# Patient Record
Sex: FEMALE | Race: BLACK OR AFRICAN AMERICAN | NOT HISPANIC OR LATINO | Employment: FULL TIME | URBAN - METROPOLITAN AREA
[De-identification: names, ages, dates, MRNs, and addresses within clinical notes are randomized per-mention and may not be internally consistent; named-entity substitution may affect disease eponyms.]

---

## 2018-04-04 ENCOUNTER — OFFICE VISIT (OUTPATIENT)
Dept: URGENT CARE | Facility: CLINIC | Age: 48
End: 2018-04-04
Payer: COMMERCIAL

## 2018-04-04 VITALS
HEART RATE: 104 BPM | TEMPERATURE: 98 F | WEIGHT: 208.75 LBS | DIASTOLIC BLOOD PRESSURE: 82 MMHG | OXYGEN SATURATION: 99 % | SYSTOLIC BLOOD PRESSURE: 142 MMHG

## 2018-04-04 DIAGNOSIS — I10 HYPERTENSION, UNSPECIFIED TYPE: ICD-10-CM

## 2018-04-04 DIAGNOSIS — R25.2 MUSCLE CRAMPS: ICD-10-CM

## 2018-04-04 DIAGNOSIS — E11.8 TYPE 2 DIABETES MELLITUS WITH COMPLICATION, UNSPECIFIED LONG TERM INSULIN USE STATUS: ICD-10-CM

## 2018-04-04 DIAGNOSIS — N89.8 VAGINAL ITCHING: Primary | ICD-10-CM

## 2018-04-04 PROCEDURE — 3077F SYST BP >= 140 MM HG: CPT | Mod: CPTII,S$GLB,, | Performed by: NURSE PRACTITIONER

## 2018-04-04 PROCEDURE — 99999 PR PBB SHADOW E&M-NEW PATIENT-LVL III: CPT | Mod: PBBFAC,,, | Performed by: NURSE PRACTITIONER

## 2018-04-04 PROCEDURE — 99214 OFFICE O/P EST MOD 30 MIN: CPT | Mod: S$GLB,,, | Performed by: NURSE PRACTITIONER

## 2018-04-04 PROCEDURE — 3079F DIAST BP 80-89 MM HG: CPT | Mod: CPTII,S$GLB,, | Performed by: NURSE PRACTITIONER

## 2018-04-04 RX ORDER — AMLODIPINE BESYLATE 10 MG/1
10 TABLET ORAL
COMMUNITY
Start: 2017-07-24 | End: 2018-04-17 | Stop reason: SDUPTHER

## 2018-04-04 RX ORDER — FLUCONAZOLE 150 MG/1
150 TABLET ORAL ONCE
Qty: 2 TABLET | Refills: 0 | Status: SHIPPED | OUTPATIENT
Start: 2018-04-04 | End: 2018-04-04

## 2018-04-04 RX ORDER — CYCLOBENZAPRINE HCL 10 MG
10 TABLET ORAL 3 TIMES DAILY PRN
Qty: 20 TABLET | Refills: 0 | Status: SHIPPED | OUTPATIENT
Start: 2018-04-04 | End: 2018-04-14

## 2018-04-04 NOTE — LETTER
April 4, 2018      [facility]  [address] [facility] [address]  [place] [address]  Phone: [phone]  Fax: [phone]       Patient: Luciana Melo   YOB: 1970  Date of Visit: 04/04/2018    To Whom It May Concern:    Melissa Melo  was at [facility] on 04/04/2018. She may return to work/school on 04/05/2018 with no restrictions. If you have any questions or concerns, or if I can be of further assistance, please do not hesitate to contact me.    Sincerely,              Adrian Durham NP

## 2018-04-05 NOTE — PROGRESS NOTES
Subjective:       Patient ID: Luciana Melo is a 47 y.o. female.    Chief Complaint: No chief complaint on file.    Pt is a 47 year old female to clinic today with complaints of bilateral leg cramping that began this morning when she woke. Pt states cramping resolved after approximately 2 hours. She also states she took an otc vitamin which she believes helped. Also, complaints of a yeast infection and vaginal itching that began 1 week ago.       Vaginal Itching   The patient's primary symptoms include genital itching. The patient's pertinent negatives include no genital lesions, genital odor, genital rash, missed menses, pelvic pain, vaginal bleeding or vaginal discharge. This is a recurrent problem. The current episode started in the past 7 days. The problem occurs intermittently. The problem has been waxing and waning. The patient is experiencing no pain. The problem affects both sides. She is not pregnant. Pertinent negatives include no abdominal pain, anorexia, back pain, chills, constipation, diarrhea, discolored urine, dysuria, fever, flank pain, frequency, headaches, hematuria, joint pain, joint swelling, nausea, painful intercourse, rash, sore throat, urgency or vomiting. Nothing aggravates the symptoms. She has tried nothing for the symptoms.     Review of Systems   Constitutional: Negative for chills, diaphoresis, fatigue and fever.   HENT: Negative for congestion, sinus pressure and sore throat.    Eyes: Negative for pain.   Respiratory: Negative for cough, chest tightness, shortness of breath and wheezing.    Cardiovascular: Negative for chest pain and palpitations.   Gastrointestinal: Negative for abdominal pain, anorexia, constipation, diarrhea, nausea and vomiting.   Genitourinary: Negative for dysuria, flank pain, frequency, hematuria, missed menses, pelvic pain, urgency and vaginal discharge.   Musculoskeletal: Positive for myalgias (bilateral calfs). Negative for back pain, gait problem, joint  pain and neck pain.   Skin: Negative for rash.   Neurological: Negative for dizziness, light-headedness and headaches.       Objective:      Physical Exam   Constitutional: She is oriented to person, place, and time. She appears well-developed and well-nourished. No distress.   HENT:   Head: Normocephalic.   Right Ear: External ear normal.   Left Ear: External ear normal.   Nose: Nose normal.   Eyes: Pupils are equal, round, and reactive to light.   Genitourinary:   Genitourinary Comments: Exam deferred.     Musculoskeletal:        Right lower leg: She exhibits no tenderness, no bony tenderness, no swelling, no edema, no deformity and no laceration.        Left lower leg: She exhibits no tenderness, no bony tenderness, no swelling, no edema, no deformity and no laceration.   Neurological: She is alert and oriented to person, place, and time.   Skin: Skin is warm and dry. No rash noted. She is not diaphoretic.   Psychiatric: She has a normal mood and affect. Her speech is normal and behavior is normal. Thought content normal.   Nursing note and vitals reviewed.      Assessment:       1. Vaginal itching    2. Muscle cramps    3. Hypertension, unspecified type    4. Type 2 diabetes mellitus with complication, unspecified long term insulin use status        Plan:   Vaginal itching  -     fluconazole (DIFLUCAN) 150 MG Tab; Take 1 tablet (150 mg total) by mouth once. May repeat after 72 hours if symptoms persist  Dispense: 2 tablet; Refill: 0    Muscle cramps  -     cyclobenzaprine (FLEXERIL) 10 MG tablet; Take 1 tablet (10 mg total) by mouth 3 (three) times daily as needed for Muscle spasms.  Dispense: 20 tablet; Refill: 0    Hypertension, unspecified type  -     Ambulatory referral to Internal Medicine    Type 2 diabetes mellitus with complication, unspecified long term insulin use status  -     Ambulatory referral to Internal Medicine      Recommend establish care with PCP on appt made at todays visit.  Recommend OTC  vitamin and increase fluids. (electrolyte beverages)  Educated on diabetic diet.    Follow prescribed treatment plan as directed.  Stay hydrated and rest.  Report to ER if symptoms worsen.  Follow up with PCP in 2-3 days or sooner if symptoms do not improve.

## 2018-04-05 NOTE — PATIENT INSTRUCTIONS
Diet: Diabetes  Food is an important tool that you can use to control diabetes and stay healthy. Eating well-balanced meals in the correct amounts will help you control your blood glucose levels and prevent low blood sugar reactions. It will also help you reduce the health risks of diabetes. There is no one specific diet that is right for everyone with diabetes. But there are general guidelines to follow. A registered dietitian (RD) will create a tailored diet approach thats just right for you. He or she will also help you plan healthy meals and snacks. If you have any questions, call your dietitian for advice.     Guidelines for success  Talk with your healthcare provider before starting a diabetes diet or weight loss program. If you haven't talked with a dietitian yet, ask your provider for a referral. The following guidelines can help you succeed:  · Select foods from the 6 food groups below. Your dietitian will help you find food choices within each group. He or she will also show you serving sizes and how many servings you can have at each meal.  ¨ Grains, beans, and starchy vegetables  ¨ Vegetables  ¨ Fruit  ¨ Milk or yogurt  ¨ Meat, poultry, fish, or tofu  ¨ Healthy fats  · Check your blood sugar levels as directed by your provider. Take any medicine as prescribed by your provider.  · Learn to read food labels and pick the right portion sizes.  · Eat only the amount of food in your meal plan. Eat about the same amount of food at regular times each day. Dont skip meals. Eat meals 4 to 5 hours apart, with snacks in between.  · Limit alcohol. It raises blood sugar levels. Drink water or calorie-free diet drinks that use safe sweeteners.  · Eat less fat to help lower your risk of heart disease. Use nonfat or low-fat dairy products and lean meats. Avoid fried foods. Use cooking oils that are unsaturated, such as olive, canola, or peanut oil.  · Talk with your dietitian about safe sugar substitutes.  · Avoid  added salt. It can contribute to high blood pressure, which can cause heart disease. People with diabetes already have a risk of high blood pressure and heart disease.  · Stay at a healthy weight. If you need to lose weight, cut down on your portion sizes. But dont skip meals. Exercise is an important part of any weight management program. Talk with your provider about an exercise program thats right for you.  · For more information about the best diet plan for you, talk with a registered dietitian (RD). To find an RD in your area, contact:  ¨ Academy of Nutrition and Dietetics www.eatright.org  ¨ The American Diabetes Association 756-114-7863 www.diabetes.org  Date Last Reviewed: 8/1/2016 © 2000-2017 [employer] [address] [place] [address], [place] [address]. All rights reserved. This information is not intended as a substitute for professional medical care. Always follow your healthcare professional's instructions.

## 2018-04-17 ENCOUNTER — OFFICE VISIT (OUTPATIENT)
Dept: INTERNAL MEDICINE | Facility: CLINIC | Age: 48
End: 2018-04-17
Payer: COMMERCIAL

## 2018-04-17 ENCOUNTER — LAB VISIT (OUTPATIENT)
Dept: LAB | Facility: HOSPITAL | Age: 48
End: 2018-04-17
Attending: FAMILY MEDICINE
Payer: COMMERCIAL

## 2018-04-17 VITALS
DIASTOLIC BLOOD PRESSURE: 84 MMHG | TEMPERATURE: 97 F | OXYGEN SATURATION: 99 % | HEART RATE: 100 BPM | SYSTOLIC BLOOD PRESSURE: 136 MMHG | HEIGHT: 69 IN | BODY MASS INDEX: 31.06 KG/M2 | WEIGHT: 209.69 LBS

## 2018-04-17 DIAGNOSIS — I15.2 HYPERTENSION ASSOCIATED WITH DIABETES: ICD-10-CM

## 2018-04-17 DIAGNOSIS — E78.2 MIXED HYPERLIPIDEMIA: ICD-10-CM

## 2018-04-17 DIAGNOSIS — D25.9 UTERINE LEIOMYOMA, UNSPECIFIED LOCATION: ICD-10-CM

## 2018-04-17 DIAGNOSIS — F17.200 TOBACCO USE DISORDER: ICD-10-CM

## 2018-04-17 DIAGNOSIS — Z91.199 NON-COMPLIANCE WITH TREATMENT: ICD-10-CM

## 2018-04-17 DIAGNOSIS — E11.59 HYPERTENSION ASSOCIATED WITH DIABETES: ICD-10-CM

## 2018-04-17 DIAGNOSIS — Z00.00 ROUTINE GENERAL MEDICAL EXAMINATION AT A HEALTH CARE FACILITY: ICD-10-CM

## 2018-04-17 DIAGNOSIS — Z00.00 ROUTINE GENERAL MEDICAL EXAMINATION AT A HEALTH CARE FACILITY: Primary | ICD-10-CM

## 2018-04-17 DIAGNOSIS — D64.9 ANEMIA, UNSPECIFIED TYPE: ICD-10-CM

## 2018-04-17 DIAGNOSIS — E66.9 OBESITY (BMI 30.0-34.9): ICD-10-CM

## 2018-04-17 PROCEDURE — 80053 COMPREHEN METABOLIC PANEL: CPT

## 2018-04-17 PROCEDURE — 82607 VITAMIN B-12: CPT

## 2018-04-17 PROCEDURE — 82728 ASSAY OF FERRITIN: CPT

## 2018-04-17 PROCEDURE — 3079F DIAST BP 80-89 MM HG: CPT | Mod: CPTII,S$GLB,, | Performed by: FAMILY MEDICINE

## 2018-04-17 PROCEDURE — 99999 PR PBB SHADOW E&M-EST. PATIENT-LVL V: CPT | Mod: PBBFAC,,, | Performed by: FAMILY MEDICINE

## 2018-04-17 PROCEDURE — 83036 HEMOGLOBIN GLYCOSYLATED A1C: CPT

## 2018-04-17 PROCEDURE — 84443 ASSAY THYROID STIM HORMONE: CPT

## 2018-04-17 PROCEDURE — 99396 PREV VISIT EST AGE 40-64: CPT | Mod: S$GLB,,, | Performed by: FAMILY MEDICINE

## 2018-04-17 PROCEDURE — 82306 VITAMIN D 25 HYDROXY: CPT

## 2018-04-17 PROCEDURE — 36415 COLL VENOUS BLD VENIPUNCTURE: CPT | Mod: PO

## 2018-04-17 PROCEDURE — 80061 LIPID PANEL: CPT

## 2018-04-17 PROCEDURE — 3075F SYST BP GE 130 - 139MM HG: CPT | Mod: CPTII,S$GLB,, | Performed by: FAMILY MEDICINE

## 2018-04-17 PROCEDURE — 85025 COMPLETE CBC W/AUTO DIFF WBC: CPT

## 2018-04-17 RX ORDER — HYDROCHLOROTHIAZIDE 12.5 MG/1
12.5 TABLET ORAL
COMMUNITY
Start: 2017-05-26 | End: 2018-04-17

## 2018-04-17 RX ORDER — MECLIZINE HYDROCHLORIDE 25 MG/1
25 TABLET ORAL
COMMUNITY
Start: 2018-02-15 | End: 2018-04-17

## 2018-04-17 RX ORDER — PEN NEEDLE, DIABETIC 30 GX3/16"
NEEDLE, DISPOSABLE MISCELLANEOUS
COMMUNITY
Start: 2017-10-10 | End: 2018-10-26 | Stop reason: SDUPTHER

## 2018-04-17 RX ORDER — FERROUS SULFATE 325(65) MG
325 TABLET ORAL DAILY
COMMUNITY

## 2018-04-17 RX ORDER — AMLODIPINE BESYLATE 10 MG/1
10 TABLET ORAL DAILY
Qty: 90 TABLET | Refills: 1 | Status: SHIPPED | OUTPATIENT
Start: 2018-04-17 | End: 2019-04-18

## 2018-04-17 RX ORDER — LISINOPRIL 5 MG/1
5 TABLET ORAL DAILY
Qty: 30 TABLET | Refills: 1 | Status: SHIPPED | OUTPATIENT
Start: 2018-04-17 | End: 2018-06-14

## 2018-04-17 NOTE — PROGRESS NOTES
Subjective:       Patient ID: Luciana Melo is a 48 y.o. female.    Chief Complaint: Establish Care    48-year-old Afro-American female patient previously seen by [facility] here to establish care.  Patient with Patient Active Problem List:     Mixed hyperlipidemia     Hypertension associated with diabetes     Uncontrolled type 2 diabetes mellitus with microalbuminuria, with long-term current use of insulin     Tobacco use disorder     Anemia     Fibroid tumor     Hypertensive heart disease without CHF     Obesity (BMI 30.0-34.9)     Non-compliance with treatment  Reports that she has been taking Lantus 50 units but not regularly at a specific time, has been taking amlodipine 10 mg, patient ran out of hydrochlorothiazide 12.5 mg daily for the past 1 month.   Occasionally has tingling and numbness sensation to the feet but not regularly, denies of any polyuria polydipsia polyphagia, has been noticing weight loss, unintentionally, patient has been feeling tired lately.   Continues to smoke 2-3 cigarettes daily.  Denies of any chest pain or difficulty breathing or palpitations  Patient was informed about anemia and has been taking iron tablets once daily, continues to take vitamin B12, was informed that she has fibroids and has been monitored by gynecologist at [facility], does not recall her name.   Reports having exercise with walking 1-2 days a week        Review of Systems   Constitutional: Positive for fatigue and unexpected weight change.   Eyes: Negative for visual disturbance.   Respiratory: Negative for shortness of breath.    Cardiovascular: Negative for chest pain and leg swelling.   Gastrointestinal: Negative for abdominal pain, nausea and vomiting.   Endocrine: Negative for polydipsia, polyphagia and polyuria.   Musculoskeletal: Negative for myalgias.   Skin: Negative for rash.   Neurological: Positive for numbness. Negative for weakness, light-headedness and headaches.   Psychiatric/Behavioral:  "Negative for sleep disturbance.         /84 (BP Location: Right arm, Patient Position: Sitting)   Pulse 100   Temp 97.3 °F (36.3 °C) (Tympanic)   Ht 5' 9" (1.753 m)   Wt 95.1 kg (209 lb 10.5 oz)   LMP 02/01/2018 (Within Days)   SpO2 99%   BMI 30.96 kg/m²   Objective:      Physical Exam   Constitutional: She is oriented to person, place, and time. She appears well-developed and well-nourished.   HENT:   Head: Normocephalic and atraumatic.   Mouth/Throat: Oropharynx is clear and moist.   Cardiovascular: Normal rate, regular rhythm and normal heart sounds.    No murmur heard.  Pulses:       Dorsalis pedis pulses are 2+ on the right side, and 2+ on the left side.   Pulmonary/Chest: Effort normal and breath sounds normal. She has no wheezes.   Abdominal: Soft. Bowel sounds are normal. There is no tenderness.   Musculoskeletal: She exhibits no edema or tenderness.   Feet:   Right Foot:   Protective Sensation: 10 sites tested. 10 sites sensed.   Skin Integrity: Negative for callus or dry skin.   Left Foot:   Protective Sensation: 10 sites tested. 10 sites sensed.   Skin Integrity: Negative for callus or dry skin.   Neurological: She is alert and oriented to person, place, and time.   Skin: Skin is warm and dry. No rash noted.   Psychiatric: She has a normal mood and affect.         Assessment:       1. Routine general medical examination at a health care facility    2. Hypertension associated with diabetes    3. Uncontrolled type 2 diabetes mellitus with microalbuminuria, with long-term current use of insulin    4. Mixed hyperlipidemia    5. Tobacco use disorder    6. Anemia, unspecified type    7. Uterine leiomyoma, unspecified location    8. Obesity (BMI 30.0-34.9)    9. Non-compliance with treatment        Plan:   Routine general medical examination at a health care facility  -     CBC auto differential; Future; Expected date: 04/17/2018  -     Comprehensive metabolic panel; Future; Expected date: " 04/17/2018  -     Lipid panel; Future; Expected date: 04/17/2018  -     TSH; Future; Expected date: 04/17/2018  -     Hemoglobin A1c; Future; Expected date: 04/17/2018  -     Urinalysis; Future; Expected date: 04/17/2018  -     Microalbumin/creatinine urine ratio; Future; Expected date: 04/17/2018  Patient reports that she ate 2 cookies this morning, would like to get labs done today.   Vital signs stable today  Clinical exam stable  Encouraged to start strict lifestyle changes with 1800 ADA low-fat and low-cholesterol diet and exercise 30 minutes daily  Refuses further vaccinations  Patient was advised to make appointment with gynecologist at [facility], for annual well woman exam    Hypertension associated with diabetes  -     Comprehensive metabolic panel; Future; Expected date: 04/17/2018  -     Lipid panel; Future; Expected date: 04/17/2018  -     TSH; Future; Expected date: 04/17/2018  -     Urinalysis; Future; Expected date: 04/17/2018  -     amLODIPine (NORVASC) 10 MG tablet; Take 1 tablet (10 mg total) by mouth once daily.  Dispense: 90 tablet; Refill: 1  -     lisinopril (PRINIVIL,ZESTRIL) 5 MG tablet; Take 1 tablet (5 mg total) by mouth once daily.  Dispense: 30 tablet; Refill: 1  Blood pressure stable today currently on amlodipine 10 mg, will add lisinopril 5 mg daily due to  diabetes.     Uncontrolled type 2 diabetes mellitus with microalbuminuria, with long-term current use of insulin  -     Ambulatory consult to Diabetic Education  -     Comprehensive metabolic panel; Future; Expected date: 04/17/2018  -     Lipid panel; Future; Expected date: 04/17/2018  -     Hemoglobin A1c; Future; Expected date: 04/17/2018  -     Microalbumin/creatinine urine ratio; Future; Expected date: 04/17/2018  -     Ambulatory referral to Optometry  -     insulin detemir U-100 (LEVEMIR FLEXTOUCH U-100 INSULN) 100 unit/mL (3 mL) SubQ InPn pen; INJECT 60 UNITS UNDER THE SKIN EACH EVENING  Dispense: 15 mL; Refill: 1  Will  increase Levemir from 50-60 units daily, patient reports that her sugars have been running in the range of 350s to 400s.   Diabetic foot exam stable today  Will need eye exam  Strict lifestyle changes recommended with 1800 ADA low-fat and low-cholesterol diet and exercise 30 minutes daily  Will refer to diabetes clinic for management of blood glucose levels which has been uncontrolled  Refill given on Levemir daily  Encouraged to monitor blood glucose levels at least twice daily and bring blood glucose log to diabetes clinic and to my next visit    Mixed hyperlipidemia  -     Lipid panel; Future; Expected date: 04/17/2018  Reports that she was on statins in the past but has been discontinued as her cholesterol levels stabilized, will check levels and may need to start on low dose statin    Tobacco use disorder  -     Ambulatory referral to Smoking Cessation Program  Encouraged to quit smoking.  Will refer to smoking cessation program    Anemia, unspecified type  -     CBC auto differential; Future; Expected date: 04/17/2018  -     Vitamin D; Future; Expected date: 04/17/2018  -     Vitamin B12; Future; Expected date: 04/17/2018  -     Ferritin; Future; Expected date: 04/17/2018  Likely secondary to fibroids, advised to discuss further with gynecology.  Currently taking iron tablets and B12, will check further labs    Uterine leiomyoma, unspecified location-[facility]    Obesity (BMI 30.0-34.9)-lifestyle modifications recommended to lose weight with BMI 30    Non-compliance with treatment  Reinforced about medication compliance to control blood glucose levels.

## 2018-04-18 LAB
25(OH)D3+25(OH)D2 SERPL-MCNC: 12 NG/ML
ALBUMIN SERPL BCP-MCNC: 3.8 G/DL
ALP SERPL-CCNC: 128 U/L
ALT SERPL W/O P-5'-P-CCNC: 32 U/L
ANION GAP SERPL CALC-SCNC: 15 MMOL/L
AST SERPL-CCNC: 30 U/L
BASOPHILS # BLD AUTO: 0.04 K/UL
BASOPHILS NFR BLD: 0.4 %
BILIRUB SERPL-MCNC: 0.6 MG/DL
BUN SERPL-MCNC: 7 MG/DL
CALCIUM SERPL-MCNC: 10 MG/DL
CHLORIDE SERPL-SCNC: 99 MMOL/L
CHOLEST SERPL-MCNC: 178 MG/DL
CHOLEST/HDLC SERPL: 5.6 {RATIO}
CO2 SERPL-SCNC: 21 MMOL/L
CREAT SERPL-MCNC: 0.8 MG/DL
DIFFERENTIAL METHOD: ABNORMAL
EOSINOPHIL # BLD AUTO: 0.2 K/UL
EOSINOPHIL NFR BLD: 2.2 %
ERYTHROCYTE [DISTWIDTH] IN BLOOD BY AUTOMATED COUNT: 17.3 %
EST. GFR  (AFRICAN AMERICAN): >60 ML/MIN/1.73 M^2
EST. GFR  (NON AFRICAN AMERICAN): >60 ML/MIN/1.73 M^2
ESTIMATED AVG GLUCOSE: 312 MG/DL
FERRITIN SERPL-MCNC: 38 NG/ML
GLUCOSE SERPL-MCNC: 267 MG/DL
HBA1C MFR BLD HPLC: 12.5 %
HCT VFR BLD AUTO: 41.8 %
HDLC SERPL-MCNC: 32 MG/DL
HDLC SERPL: 18 %
HGB BLD-MCNC: 13 G/DL
IMM GRANULOCYTES # BLD AUTO: 0.04 K/UL
IMM GRANULOCYTES NFR BLD AUTO: 0.4 %
LDLC SERPL CALC-MCNC: 109.4 MG/DL
LYMPHOCYTES # BLD AUTO: 3.6 K/UL
LYMPHOCYTES NFR BLD: 32.3 %
MCH RBC QN AUTO: 22.4 PG
MCHC RBC AUTO-ENTMCNC: 31.1 G/DL
MCV RBC AUTO: 72 FL
MONOCYTES # BLD AUTO: 0.4 K/UL
MONOCYTES NFR BLD: 3.7 %
NEUTROPHILS # BLD AUTO: 6.8 K/UL
NEUTROPHILS NFR BLD: 61 %
NONHDLC SERPL-MCNC: 146 MG/DL
NRBC BLD-RTO: 0 /100 WBC
PLATELET # BLD AUTO: 423 K/UL
PMV BLD AUTO: 9.6 FL
POTASSIUM SERPL-SCNC: 4.1 MMOL/L
PROT SERPL-MCNC: 8.4 G/DL
RBC # BLD AUTO: 5.8 M/UL
SODIUM SERPL-SCNC: 135 MMOL/L
TRIGL SERPL-MCNC: 183 MG/DL
TSH SERPL DL<=0.005 MIU/L-ACNC: 0.57 UIU/ML
VIT B12 SERPL-MCNC: 771 PG/ML
WBC # BLD AUTO: 11.13 K/UL

## 2018-04-19 ENCOUNTER — TELEPHONE (OUTPATIENT)
Dept: INTERNAL MEDICINE | Facility: CLINIC | Age: 48
End: 2018-04-19

## 2018-04-19 DIAGNOSIS — E78.2 MIXED HYPERLIPIDEMIA: Primary | ICD-10-CM

## 2018-04-19 DIAGNOSIS — E55.9 VITAMIN D DEFICIENCY: ICD-10-CM

## 2018-04-19 RX ORDER — ERGOCALCIFEROL 1.25 MG/1
50000 CAPSULE ORAL
Qty: 10 CAPSULE | Refills: 0 | Status: SHIPPED | OUTPATIENT
Start: 2018-04-19 | End: 2018-06-14

## 2018-04-19 RX ORDER — ATORVASTATIN CALCIUM 20 MG/1
20 TABLET, FILM COATED ORAL DAILY
Qty: 90 TABLET | Refills: 3 | Status: SHIPPED | OUTPATIENT
Start: 2018-04-19 | End: 2019-04-03

## 2018-04-19 NOTE — TELEPHONE ENCOUNTER
Spoke with pt, advised of medications and recommendations and to keep appt with diabetes clinic as scheduled to follow-up on elevated A1c. Pt verbalized understanding. FLORENCE

## 2018-04-25 ENCOUNTER — TELEPHONE (OUTPATIENT)
Dept: INTERNAL MEDICINE | Facility: CLINIC | Age: 48
End: 2018-04-25

## 2018-04-25 NOTE — TELEPHONE ENCOUNTER
----- Message from Tosin Nunn sent at 4/25/2018 11:27 AM CDT -----  Contact: Patient  Patient needs to speak to nurse regarding some FMLA paperwork, please call her back at [phone]. Thank you

## 2018-04-25 NOTE — TELEPHONE ENCOUNTER
"Spoke with pt who stated she needs FMLA paperwork filled out due to having diabetes and her blood sugar level goes up and down and she will wake up and feel "generally bad."  Wants FMLA completed so that she can be late to work or not go in to work on days when she feels bad.  Stated she went in to work late today because she felt dizzy and weak.  Advised pt to fax FMLA paperwork to clinic so that Dr. Lloyd can look at paperwork and chart and determine if she will be able to complete forms.  Notified pt that message will be sent to Dr. Lloyd and forms will be given to Dr. Lloyd once they are received and that she will get a phone call from Dr. Lloyd's office by tomorrow morning to let her know whether or not Dr. Lloyd will be able to fill out forms.  Pt verbalized understanding.  "

## 2018-04-26 NOTE — TELEPHONE ENCOUNTER
Conveyed Dr. Lloyd's message to pt re: not being able to fill out FMLA paperwork for uncontrolled diabetes.  Pt stated she didn't have uncontrolled diabetes and that she just doesn't feel well sometimes.  Stated Dr. Lloyd must not have gotten her message or that what she said must have been conveyed to Dr. Lloyd wrong.  I read my message that was sent to Dr. Lloyd and asked if she agreed with the message.  Pt stated she did.  I then read to the pt Dr. Lloyd's message verbatim.  Pt interrupted me several times while I was reading the message stating she does not have uncontrolled diabetes and that she does take her medications as prescribed and has never not taken her medications.  Pt stated Dr. Lloyd can tear up her FMLA paperwork and that she will find a doctor who can accommodate her.         Will not be able to fill out FMLA  paperwork for uncontrolled diabetes.  Patient needs to keep her appointments with diabetes clinic as scheduled and work on lowering blood glucose levels, she has been uncontrolled in the past, secondary to not taking her medications regularly.  Patient needs to work on getting her blood glucose levels under control by taking her medications as recommended including insulin , her symptoms will improve once her glucose levels will start to stabilize.      Dr. Lloyd (Routing comment)

## 2018-04-27 ENCOUNTER — NURSE TRIAGE (OUTPATIENT)
Dept: ADMINISTRATIVE | Facility: CLINIC | Age: 48
End: 2018-04-27

## 2018-04-30 ENCOUNTER — TELEPHONE (OUTPATIENT)
Dept: INTERNAL MEDICINE | Facility: CLINIC | Age: 48
End: 2018-04-30

## 2018-04-30 NOTE — TELEPHONE ENCOUNTER
----- Message from Selena Stanley sent at 4/30/2018  3:44 PM CDT -----  Contact: pt  Pt is requesting a call back from in regards to getting  Her insulin approved by her insurance. Pt state that her Insulin is $300.00  And needs one that is  Less costly .   [phone] (home)

## 2018-04-30 NOTE — TELEPHONE ENCOUNTER
----- Message from Aster Campos sent at 4/30/2018  2:14 PM CDT -----  Contact: Patient   Patient is still waiting on her call back, Please call her at [phone].    Thanks  Td

## 2018-04-30 NOTE — TELEPHONE ENCOUNTER
Pt states that Levemir is not covered by [employer].  She is supposed to have 60 units qhs.  She has not had her insulin in 3 days.  I advised pt to contact her insurance to see what brands of insulin are covered and call Dr. Lloyd's office back to let us know.  Please advise if you know of any other that may be covered./rpr

## 2018-04-30 NOTE — TELEPHONE ENCOUNTER
----- Message from Yana Ramon sent at 4/30/2018 12:12 PM CDT -----  Contact: Pt  Please give pt a call at ..[phone] (home) regarding her insulin not being covered through her insurance and states that she has been calling for several days and no one has called her and also that she's been out for about three days

## 2018-04-30 NOTE — TELEPHONE ENCOUNTER
PA request initiated to pt's ins for Levemir rx, submitted online via [email or web address] Request [id number]; returned fax to pt's pharm notifying them PA request initiated and will alert them when we receive a response from pt's ins, fax transmission confirmed F#[phone].    Pt advised PA requested and and for pt to call her ins to conf what alternatives her ins does cover, pt voiced understanding.

## 2018-05-01 ENCOUNTER — TELEPHONE (OUTPATIENT)
Dept: INTERNAL MEDICINE | Facility: CLINIC | Age: 48
End: 2018-05-01

## 2018-05-01 RX ORDER — INSULIN GLARGINE 100 [IU]/ML
60 INJECTION, SOLUTION SUBCUTANEOUS NIGHTLY
Qty: 10 ML | Refills: 3 | Status: SHIPPED | OUTPATIENT
Start: 2018-05-01 | End: 2018-05-03 | Stop reason: CLARIF

## 2018-05-01 NOTE — TELEPHONE ENCOUNTER
Will change levemir to lantus as per insurance coverage but she should follow up with diabetes clinic

## 2018-05-01 NOTE — TELEPHONE ENCOUNTER
Ins DENIED PA request on Levemir Flexpen, alternative Lantus Solostar or Humalog Kwikpen, please advise?

## 2018-05-01 NOTE — TELEPHONE ENCOUNTER
Spoke with pt, advised that Dr. Lloyd switched levemir to Lantus as per insurance coverage and to follow-up with Diabetes Clinic as scheduled. FLORENCE

## 2018-05-02 ENCOUNTER — OFFICE VISIT (OUTPATIENT)
Dept: URGENT CARE | Facility: CLINIC | Age: 48
End: 2018-05-02
Payer: COMMERCIAL

## 2018-05-02 VITALS
HEIGHT: 69 IN | BODY MASS INDEX: 30.92 KG/M2 | DIASTOLIC BLOOD PRESSURE: 90 MMHG | SYSTOLIC BLOOD PRESSURE: 170 MMHG | TEMPERATURE: 99 F | OXYGEN SATURATION: 95 % | RESPIRATION RATE: 18 BRPM | WEIGHT: 208.75 LBS | HEART RATE: 96 BPM

## 2018-05-02 DIAGNOSIS — R73.9 HYPERGLYCEMIA: Primary | ICD-10-CM

## 2018-05-02 DIAGNOSIS — Z91.148 POOR COMPLIANCE WITH MEDICATION: ICD-10-CM

## 2018-05-02 DIAGNOSIS — R51.9 NONINTRACTABLE HEADACHE, UNSPECIFIED CHRONICITY PATTERN, UNSPECIFIED HEADACHE TYPE: ICD-10-CM

## 2018-05-02 LAB — GLUCOSE SERPL-MCNC: 168 MG/DL (ref 70–110)

## 2018-05-02 PROCEDURE — 99213 OFFICE O/P EST LOW 20 MIN: CPT | Mod: S$GLB,,, | Performed by: NURSE PRACTITIONER

## 2018-05-02 PROCEDURE — 3008F BODY MASS INDEX DOCD: CPT | Mod: CPTII,S$GLB,, | Performed by: NURSE PRACTITIONER

## 2018-05-02 PROCEDURE — 99999 PR PBB SHADOW E&M-EST. PATIENT-LVL IV: CPT | Mod: PBBFAC,,, | Performed by: NURSE PRACTITIONER

## 2018-05-02 PROCEDURE — 3077F SYST BP >= 140 MM HG: CPT | Mod: CPTII,S$GLB,, | Performed by: NURSE PRACTITIONER

## 2018-05-02 PROCEDURE — 3080F DIAST BP >= 90 MM HG: CPT | Mod: CPTII,S$GLB,, | Performed by: NURSE PRACTITIONER

## 2018-05-02 PROCEDURE — 82948 REAGENT STRIP/BLOOD GLUCOSE: CPT | Mod: S$GLB,,, | Performed by: NURSE PRACTITIONER

## 2018-05-02 NOTE — LETTER
May 2, 2018      [facility]  [address] [facility] [address] [place] [address]  Phone: [phone]  Fax: [phone]       Patient: Luciana Melo   YOB: 1970  Date of Visit: 05/02/2018    To Whom It May Concern:    Melissa Melo  was at [facility] on 05/02/2018. She may return to work/school on 05/03/2018 with no restrictions. If you have any questions or concerns, or if I can be of further assistance, please do not hesitate to contact me.    Sincerely,          Jasper Pat LPN

## 2018-05-03 ENCOUNTER — TELEPHONE (OUTPATIENT)
Dept: INTERNAL MEDICINE | Facility: CLINIC | Age: 48
End: 2018-05-03

## 2018-05-03 RX ORDER — INSULIN GLARGINE 100 [IU]/ML
INJECTION, SOLUTION SUBCUTANEOUS
Qty: 15 ML | Refills: 1 | Status: SHIPPED | OUTPATIENT
Start: 2018-05-03 | End: 2018-10-26

## 2018-05-03 NOTE — PATIENT INSTRUCTIONS
For Kids: Taking Your Insulin    The digestive system breaks down food, resulting in a sugar called glucose. Some of this glucose is stored in the liver. But most of it enters the bloodstream and travels to the cells to be used as fuel. Glucose needs the help of the hormone insulin to enter the cells. Insulin is made in the pancreas, an organ in the abdomen. The insulin is released into the bloodstream in response to the presence of glucose in the blood.  Think of insulin as a key. When insulin reaches a cell, it attaches to the cell wall. This signals the cell to create an opening that allows glucose to enter the cell. Without insulin, your cells cant get glucose to burn for energy. This is why you may feel weak or tired.  The insulin you are missing can be replaced with shots of insulin (injections). Some children also use insulin pumps. Then your body can burn glucose for energy. This helps keep your blood sugar within a health range.  Feeling nervous is normal  Most people with diabetes are scared to give themselves insulin injections in the beginning. Even your parents were probably nervous giving you your first injections. But after a while, it became much easier. With a little practice, youll get used to injections, too. And pretty soon you wont feel nervous or scared.  Types of insulin  The types of insulin are:  · Fast-acting insulin. Take fast-acting insulin with meals. You have to eat within 15 minutes of taking it.  · Regular or short-acting insulin. Short-acting insulin is also usually taken before a meal. It will usually reach the bloodstream within 30 minutes after injection.  · Intermediate-acting insulin. Intermediate-acting insulin takes longer to start working than fast-acting insulin. But it stays in your bloodstream longer.  · Long-acting insulin. Long-acting insulin makes sure there is a little insulin in your bloodstream at all times. It helps keep your blood sugar in control.  Using a  syringe  Always test your blood sugar before injecting insulin. Blood sugar readings help you decide how much insulin to give yourself. When injecting insulin, make sure you inject into the fat just under the skin. Many people with diabetes inject using a syringe. Follow the steps below for injecting insulin with a syringe.  Step 1: Getting ready  · Gather your supplies. Heres what youll need:  ¨ A new syringe  ¨ Insulin  ¨ Alcohol wipes  ¨ Special container to throw out the old needle (sharps container). This can be bought at a [employer] or medical supply store. Or you can use any puncture-proof container with a puncture-proof lid, like an empty laundry detergent bottle.  ¨ Parent, teacher, or other adult to watch  · Wash your hands. Use soap and warm water.  · Clean the insulin bottle. Wipe the top of the rubber top (stopper) of the insulin bottle (vial) with an alcohol wipe.  · Prepare the insulin. If you use cloudy insulin, roll the bottle gently between your hands about 20 times. Dont shake the insulin. And dont use cold insulin. Instead, keep one bottle at room temperature and put the rest in the refrigerator.  Step 2: Preparing the syringe  · Remove the syringe from its package.  · Take the cap off the needle.  · Draw air into the syringe. Pull back the plunger to get air into the syringe. Pull the plunger back to the estela (line) for the number of units of insulin you want to inject. The estela on the syringe nearest the needle is 0 (not 1).  · Inject air into the insulin. Hold the bottle on a flat surface with one hand. With your other hand, hold the syringe straight up and down. Slowly push in the plunger to inject air into the insulin.  · Turn bottle with the syringe upside down. Keep the needle in the stopper. Flip the syringe and bottle so that the bottle is now on top and the syringe is on the bottom. Be careful not to bend the needle when tipping the insulin bottle.  · Draw insulin into the  syringe. Keep the tip of the needle below the level of insulin. You may need to pull the needle out a little. Slowly pull back the plunger to draw out insulin. Ask an adult to check the dose.  · Check for air bubbles. Gently tap the syringe while the needle is still in the stopper. The air bubbles will move to the top of the syringe. Push the plunger in a tiny bit to release the air bubbles back into the insulin bottle. Your healthcare provider, nurse, or a diabetes educator may show you other ways to remove air bubbles.  · Remove the needle from the insulin bottle.  Step 3: Giving the injection  · Clean the injection site. Use an alcohol wipe to clean the area where youre going to inject. Let the area air-dry. If the skin is wet with alcohol, the injection will sting.  · Pinch an inch of skin. Pull up about 1 inch of skin. Pinch the skin gently. Dont squeeze it. This is to make sure you dont inject into a muscle.  · Insert the needle. Insert the needle into the skin at the angle you were shown. Push the needle in until you cant see it anymore.  · Inject the insulin. Slowly push in the plunger until the syringe is empty.  Step 4: Removing the needle  · Count to 5 before pulling out the needle.  · Remove the needle from the skin.  · Watch the injection site for leaking insulin and for bleeding. If the site bleeds, dab it with a cotton ball or tissue. If insulin leaks, ask your healthcare provider, nurse, or diabetes educator to make sure you are doing it correctly.  Step 5: After the injection  · Put the syringe directly in a sharps container. Don't lay it down anywhere. And dont recap the needle. Be sure you eat within 15 minutes of injecting fast-acting insulin.  Using an insulin pen  You can also use injection pens to get the insulin you need. Injection pens look like writing pens. Insulin pens hold cartridges of insulin. A new needle is used for every injection. There are different kinds of insulin pens.  Your healthcare provider, nurse, or diabetes educator will tell you which pen is best for you. One of them will also show you how to use the pen.   Tips for pen users  · Wash your hands with soap and water.  · Clean the injection site with an alcohol wipe.  · Use a new needle each time  · Never leave the needle on the pen when youre not using it.  · Before injecting, tap the needle with your fingertip to get rid of air bubbles.  · Then test the pen by dialing to 2 pressing the injection button all the way. Insulin should come out of the needle when you do this. If not, check for air bubbles again. Then test again. If no insulin comes out after 3 tries, start over with a new needle.  · Ask an adult to check the insulin dose you have dialed for yourself.  Storing insulin  · Keep insulin you are not using in the refrigerator. Make sure it is never frozen.  · Use insulin before it goes bad (expires). For pen users, the expiration date is usually on the box.  · Use insulin within 28 days of opening it  · Carry your insulin in a bag made to protect it from heat and cold.   Injection spot  It's up to you to find the best spot (site) to inject insulin. When choosing a site, keep these facts in mind:  · You can inject insulin in the back of your arms, buttocks, the top or sides of your thighs, and your belly (abdomen). Stay at least 2 inches away from the bellybutton (navel).  · Insulin works fastest when injected into the belly (abdomen).  · You need to change injection sites often. Leave about 1 inch between injection sites.  Giving yourself injections  You don't have to give yourself injections until you are ready. Tell your parents or your healthcare provider, nurse, or diabetes educator how you're feeling. They will support you and help you. And, when you do decide to start giving yourself insulin, they will continue to help you.  Resources  Still have questions about diabetes? Try these websites:  · American Diabetes  Association www.diabetes.org/living-with-diabetes/parents-and-kids/planet-d  · Children with Diabetes www.childrenwithdiabetes.com  · Juvenile Diabetes Research Foundation International www.kids.jdrf.org  Date Last Reviewed: 10/1/2016  © 1786-1061 [employer]. [address] [place] [address], [place] [address]. All rights reserved. This information is not intended as a substitute for professional medical care. Always follow your healthcare professional's instructions.

## 2018-05-03 NOTE — TELEPHONE ENCOUNTER
Discussed with patient in detail today, about her recent urgent care visit last night.   Patient informed that she did not pickup free Lantus prescription as informed by urgent care physician, patient informed that she cannot afford Lantus vial which is $55, and Lantus will solostar  Flex pen is covered by her insurance with prior authorization, we will send Lantus flex pen to our pharmacy, patient was advised to come and  her Lantus prescription here.   Patient reports that she has not been feeling well and occasionally misses work, and would like to have FMLA paperwork.  Patient is not very clear that her not feeling well is just not from diabetes, but she is not able to explain in detail, of why she does not feel well.   Patient was informed to keep appointment with diabetes clinic as scheduled in 2 weeks.   Patient was informed that her sugars are uncontrolled at this time, and once sugars start to get better, she will not have dizzy spells, or not feeling well episodes.   Patient unclear with her health at this time.   Patient reports that she never had  paperwork filled out by previous primary care physician for FMLA.  Patient was informed that she establish care  with us recently and had seen her once, we will try to get her with diabetes clinic and help her control her diabetes, patient should be compliant with taking her medications.  Was informed that FMLA paperwork cannot be filled out at this time.  Patient was not happy, as it seems that she is coming in to establish care with us to get FMLA paperwork filled out.

## 2018-05-03 NOTE — PROGRESS NOTES
Subjective:       Patient ID: Luciana Melo is a 48 y.o. female.    Chief Complaint: No chief complaint on file.    Pt is a 48 year old female to clinic today with complaints of not feeling well and HA that she believes is due to not having diabetes medications due to insurance and price. Pt states she was swapped to [facility] yesterday and prescription is still $55.00 which she can not afford. Pt states her CBG has ranged from 200-500 mg/dL. Pt states she has not been eating in order to control her glucose. Pt crying and upset.       Headache    This is a new problem. The current episode started in the past 7 days. The problem occurs intermittently. The problem has been waxing and waning. The pain is located in the frontal region. The pain does not radiate. The quality of the pain is described as aching. The pain is at a severity of 4/10. The pain is mild. Pertinent negatives include no abdominal pain, abnormal behavior, anorexia (states not eating to control glucose.), back pain, blurred vision, coughing, dizziness, drainage, ear pain, eye pain, eye redness, eye watering, facial sweating, fever, hearing loss, insomnia, loss of balance, muscle aches, nausea, neck pain, numbness, phonophobia, photophobia, rhinorrhea, scalp tenderness, seizures, sinus pressure, sore throat, swollen glands, tingling, tinnitus, visual change, vomiting, weakness or weight loss. Exacerbated by: not eating and elevated CBG. She has tried nothing for the symptoms.     Review of Systems   Constitutional: Positive for fatigue. Negative for chills, diaphoresis, fever and weight loss.   HENT: Negative for congestion, ear pain, hearing loss, postnasal drip, rhinorrhea, sinus pressure, sneezing, sore throat and tinnitus.    Eyes: Negative for blurred vision, photophobia, pain, redness and visual disturbance.   Respiratory: Negative for cough, chest tightness, shortness of breath and wheezing.    Cardiovascular: Negative for chest pain and  palpitations.   Gastrointestinal: Negative for abdominal pain, anorexia (states not eating to control glucose.), diarrhea, nausea and vomiting.   Genitourinary: Negative for dysuria.   Musculoskeletal: Negative for back pain, myalgias, neck pain and neck stiffness.   Skin: Negative for rash.   Neurological: Positive for headaches. Negative for dizziness, tingling, seizures, syncope, facial asymmetry, speech difficulty, weakness, light-headedness, numbness and loss of balance.   Psychiatric/Behavioral: Negative for suicidal ideas. The patient does not have insomnia.        Objective:      Physical Exam   Constitutional: She is oriented to person, place, and time. She appears well-developed and well-nourished.  Non-toxic appearance. She does not have a sickly appearance. She does not appear ill. No distress.   HENT:   Head: Normocephalic.   Right Ear: Tympanic membrane, external ear and ear canal normal.   Left Ear: Tympanic membrane, external ear and ear canal normal.   Nose: Nose normal.   Mouth/Throat: Oropharynx is clear and moist.   Eyes: Conjunctivae and EOM are normal. Pupils are equal, round, and reactive to light.   Neck: Normal range of motion. Neck supple.   Cardiovascular: Normal rate, regular rhythm, S1 normal, S2 normal and normal heart sounds.  Exam reveals no gallop and no friction rub.    No murmur heard.  Pulmonary/Chest: Effort normal and breath sounds normal. No accessory muscle usage. No apnea, no tachypnea and no bradypnea. No respiratory distress. She has no decreased breath sounds. She has no wheezes. She has no rhonchi. She has no rales.   Neurological: She is alert and oriented to person, place, and time. She is not disoriented. No cranial nerve deficit or sensory deficit. Gait normal.   Skin: Skin is warm and dry. No rash noted. She is not diaphoretic.   Psychiatric: Her speech is normal and behavior is normal. Thought content normal. She exhibits a depressed mood.   Nursing note and vitals  reviewed.      Assessment:       1. Hyperglycemia    2. Nonintractable headache, unspecified chronicity pattern, unspecified headache type    3. Poor compliance with medication        Plan:   Hyperglycemia    Nonintractable headache, unspecified chronicity pattern, unspecified headache type  -     POCT Glucose    Poor compliance with medication      Recommend tylenol/ibuprofen PRN HA.  Discussed with pt to apply through [employer] web site for 3 month free med car. Showed pt on computer how to apply.  Pt stated she had never used vial insulin (pens only) and she was unsure how to administer. Showed pt on lantus web site where step by step directions and diagram instructions on administration.  Will discuss pt with PCP in AM.   Pt stated she felt better about situation and was going immediately to pharmacy to  meds. Instructed pt to resume eating like normal and monitor for blood sugar lows/highs.    Follow prescribed treatment plan as directed.  Stay hydrated and rest.  Report to ER if symptoms worsen.  Follow up with PCP in 2-3 days or sooner if symptoms do not improve.

## 2018-05-04 ENCOUNTER — PATIENT OUTREACH (OUTPATIENT)
Dept: ADMINISTRATIVE | Facility: HOSPITAL | Age: 48
End: 2018-05-04

## 2018-05-04 NOTE — LETTER
May 4, 2018    Luciana Melo  [address]  [place] [address]             [facility]  [address]  [facility] [address]  Phone: [phone] Dear MsLesly RichardKameron:    [facility] is committed to your overall health.  To help you get the most out of each of your visits, we will review your information to make sure you are up to date on all of your recommended tests and/or procedures.      Anni Lloyd MD has found that you may be due for   Health Maintenance Due   Topic    TETANUS VACCINE     Pneumococcal PPSV23 (Medium Risk) (1)    Eye Exam     Mammogram         If you have had any of the above done at another facility, please bring the records or information with you so that your record at [facility] will be complete.    If you are currently taking medication, please bring it with you to your appointment for review.    We will be happy to assist you with scheduling any necessary appointments or you may contact the [facility] appointment desk at [phone] to schedule at your convenience.     Thank you for choosing [facility] for your healthcare needs,    Anamaria JAMES LPN Care Coordinator  [facility]  [phone]

## 2018-05-10 ENCOUNTER — TELEPHONE (OUTPATIENT)
Dept: PHARMACY | Facility: CLINIC | Age: 48
End: 2018-05-10

## 2018-05-10 NOTE — TELEPHONE ENCOUNTER
Called notifying patient PA on Laurent Ordaz was approved on her insurance for a copayment of $55.00.    Assisted patient with applying for a co-pay card reducing cost to $0.00.    Patient was very thankful for the assistance and will  medication sometime today.    PA information:  [employer]  [phone]  Copayment: $55.00 (reduced to $0.00)  Approved through 12/31/2018

## 2018-05-30 ENCOUNTER — TELEPHONE (OUTPATIENT)
Dept: INTERNAL MEDICINE | Facility: CLINIC | Age: 48
End: 2018-05-30

## 2018-05-30 DIAGNOSIS — B37.9 YEAST INFECTION: Primary | ICD-10-CM

## 2018-05-30 RX ORDER — FLUCONAZOLE 150 MG/1
150 TABLET ORAL DAILY
Qty: 1 TABLET | Refills: 0 | Status: SHIPPED | OUTPATIENT
Start: 2018-05-30 | End: 2018-05-31

## 2018-05-30 NOTE — TELEPHONE ENCOUNTER
----- Message from Latisha Brown sent at 5/30/2018 11:50 AM CDT -----  Contact: self/[phone]  Would like to consult with nurse regarding yeast infection, please call back at [phone]. Thanks/ar

## 2018-05-30 NOTE — TELEPHONE ENCOUNTER
Returned call to pt, states that she's still having issue with yeast infection that she was seen for in urgent care and would like something called. Advised that would send message to Dr. Lloyd and call back with response. FLORENCE

## 2018-05-30 NOTE — TELEPHONE ENCOUNTER
Returned call to pt to inform her that Rx refill for diflucan has been sent to the pharmacy. No answer and unable to leave message, will call back at a later time. FLORENCE

## 2018-05-30 NOTE — TELEPHONE ENCOUNTER
Diflucan will be sent to her pharmacy, but I guess patient is having frequent yeast infections secondary to uncontrolled blood glucose levels

## 2018-06-13 ENCOUNTER — OFFICE VISIT (OUTPATIENT)
Dept: URGENT CARE | Facility: CLINIC | Age: 48
End: 2018-06-13
Payer: COMMERCIAL

## 2018-06-13 VITALS
WEIGHT: 207.88 LBS | HEIGHT: 70 IN | TEMPERATURE: 98 F | SYSTOLIC BLOOD PRESSURE: 156 MMHG | HEART RATE: 108 BPM | DIASTOLIC BLOOD PRESSURE: 78 MMHG | OXYGEN SATURATION: 98 % | RESPIRATION RATE: 16 BRPM | BODY MASS INDEX: 29.76 KG/M2

## 2018-06-13 DIAGNOSIS — R51.9 NONINTRACTABLE HEADACHE, UNSPECIFIED CHRONICITY PATTERN, UNSPECIFIED HEADACHE TYPE: Primary | ICD-10-CM

## 2018-06-13 DIAGNOSIS — F41.8 ANXIETY WITH DEPRESSION: ICD-10-CM

## 2018-06-13 PROCEDURE — 99214 OFFICE O/P EST MOD 30 MIN: CPT | Mod: 25,S$GLB,, | Performed by: NURSE PRACTITIONER

## 2018-06-13 PROCEDURE — 3078F DIAST BP <80 MM HG: CPT | Mod: CPTII,S$GLB,, | Performed by: NURSE PRACTITIONER

## 2018-06-13 PROCEDURE — 99999 PR PBB SHADOW E&M-EST. PATIENT-LVL IV: CPT | Mod: PBBFAC,,, | Performed by: NURSE PRACTITIONER

## 2018-06-13 PROCEDURE — 96372 THER/PROPH/DIAG INJ SC/IM: CPT | Mod: S$GLB,,, | Performed by: NURSE PRACTITIONER

## 2018-06-13 PROCEDURE — 3077F SYST BP >= 140 MM HG: CPT | Mod: CPTII,S$GLB,, | Performed by: NURSE PRACTITIONER

## 2018-06-13 RX ORDER — KETOROLAC TROMETHAMINE 30 MG/ML
30 INJECTION, SOLUTION INTRAMUSCULAR; INTRAVENOUS
Status: COMPLETED | OUTPATIENT
Start: 2018-06-13 | End: 2018-06-13

## 2018-06-13 RX ADMIN — KETOROLAC TROMETHAMINE 30 MG: 30 INJECTION, SOLUTION INTRAMUSCULAR; INTRAVENOUS at 08:06

## 2018-06-13 NOTE — LETTER
June 13, 2018      [facility]  [address]  [place] [address]  Phone: [phone]  Fax: [phone]       Patient: Luciana Melo   YOB: 1970  Date of Visit: 06/13/2018    To Whom It May Concern:    Melissa Melo  was at [facility] on 06/13/2018. She may return to work/school on 06/15/2018 with no restrictions. If you have any questions or concerns, or if I can be of further assistance, please do not hesitate to contact me.    Sincerely,            Adrian Durham, NP

## 2018-06-14 ENCOUNTER — OFFICE VISIT (OUTPATIENT)
Dept: INTERNAL MEDICINE | Facility: CLINIC | Age: 48
End: 2018-06-14
Payer: COMMERCIAL

## 2018-06-14 VITALS
DIASTOLIC BLOOD PRESSURE: 80 MMHG | HEART RATE: 130 BPM | BODY MASS INDEX: 29.8 KG/M2 | TEMPERATURE: 98 F | OXYGEN SATURATION: 98 % | WEIGHT: 208.13 LBS | SYSTOLIC BLOOD PRESSURE: 142 MMHG | HEIGHT: 70 IN | RESPIRATION RATE: 18 BRPM

## 2018-06-14 DIAGNOSIS — F33.1 MODERATE EPISODE OF RECURRENT MAJOR DEPRESSIVE DISORDER: Primary | ICD-10-CM

## 2018-06-14 PROCEDURE — 3077F SYST BP >= 140 MM HG: CPT | Mod: CPTII,S$GLB,, | Performed by: FAMILY MEDICINE

## 2018-06-14 PROCEDURE — 99214 OFFICE O/P EST MOD 30 MIN: CPT | Mod: S$GLB,,, | Performed by: FAMILY MEDICINE

## 2018-06-14 PROCEDURE — 3079F DIAST BP 80-89 MM HG: CPT | Mod: CPTII,S$GLB,, | Performed by: FAMILY MEDICINE

## 2018-06-14 PROCEDURE — 99999 PR PBB SHADOW E&M-EST. PATIENT-LVL IV: CPT | Mod: PBBFAC,,, | Performed by: FAMILY MEDICINE

## 2018-06-14 PROCEDURE — 3008F BODY MASS INDEX DOCD: CPT | Mod: CPTII,S$GLB,, | Performed by: FAMILY MEDICINE

## 2018-06-14 RX ORDER — SERTRALINE HYDROCHLORIDE 50 MG/1
50 TABLET, FILM COATED ORAL DAILY
Qty: 30 TABLET | Refills: 11 | Status: SHIPPED | OUTPATIENT
Start: 2018-06-14 | End: 2018-10-26

## 2018-06-14 NOTE — ASSESSMENT & PLAN NOTE
Start zoloft today with referral to counseling; she is concerned about the cost of seeing a therapist. Will complete [facility] paperwork for her. Info given on depression. She is not currently suicidal; advised to go to ER if symptoms worsen or if she is considering suicide.

## 2018-06-14 NOTE — PATIENT INSTRUCTIONS
Depression  Depression is one of the most common mental health problems today. It is not just a state of unhappiness or sadness. It is a true disease. The cause seems to be related to a decrease in chemicals that transmit signals in the brain. Having a family history of depression, alcoholism, or suicide increases the risk. Chronic illness, chronic pain, migraine headaches and high emotional stress also increase the risk.  Depression is something we tend to recognize in others, but may have a hard time seeing in ourselves. It can show in many physical and emotional ways:  · Loss of appetite  · Over-eating  · Not being able to sleep  · Sleeping too much  · Tiredness not related to physical exertion  · Restlessness or irritability  · Slowness of movement or speech  · Feeling depressed or withdrawn  · Loss of interest in things you once enjoyed  · Trouble concentrating, poor memory, trouble making decisions  · Thoughts of harming or killing oneself, or thoughts that life is not worth living  · Low self-esteem  The treatment for depression may include both medicine and psychotherapy. Antidepressants can reduce suffering and can improve the ability to function during the depressed period. Therapy can offer emotional support and help you understand emotional factors that may be causing the depression.  Home care  · On-going care and support helps people manage this disease.  Find a healthcare provider and therapist who meet your needs. Seek help when you feel like you may be getting ill.  · Be kind to yourself. Make it a point to do things that you enjoy (gardening, walking in nature, going to a movie, etc.). Reward yourself for small successes.  · Take care of your physical body. Eat a balanced diet (low in saturated fat and high in fruits and vegetables). Exercise at least 3 times a week for 30 minutes. Even mild-moderate exercise (like brisk walking) can make you feel better.  · Avoid alcohol, which can make  depression worse.  · Take medicine as prescribed.  · Tell each of your healthcare providers about all of the prescription drugs, over-the-counter medicines, vitamins, and supplements you take. Certain supplements interact with medicines and can result in dangerous side effects. Ask your pharmacist when you have questions about drug interactions.  · Talk with your family and trusted friends about your feelings and thoughts. Ask them to help you recognize behavior changes early so you can get help and, if needed, medicine can be adjusted.  Follow-up care  Follow up with your healthcare provider, or as advised.  Call 911  Call 911 if you:  · Have suicidal thoughts, a suicide plan, and the means to carry out the plan  · Have trouble breathing  · Are very confused  · Feel very drowsy or have trouble awakening  · Faint or lose consciousness  · Have new chest pain that becomes more severe, lasts longer, or spreads into your shoulder, arm, neck, jaw or back  When to seek medical advice  Call your healthcare provider right away if any of these occur:  · Feeling extreme depression, fear, anxiety, or anger toward yourself or others  · Feeling out of control  · Feeling that you may try to harm yourself or another  · Hearing voices that others do not hear  · Seeing things that others do not see  · Cant sleep or eat for 3 days in a row  · Friends or family express concern over your behavior and ask you to seek help  Date Last Reviewed: 9/29/2015  © 6602-1304 [employer]. [address] [place] [address]. All rights reserved. This information is not intended as a substitute for professional medical care. Always follow your healthcare professional's instructions.        Depression: Tips to Help Yourself  As your healthcare providers help treat your depression, you can also help yourself. Keep in mind that your illness affects you emotionally, physically, mentally, and socially. So full recovery will take  time. Take care of your body and your soul, and be patient with yourself as you get better.    Self-care  · Educate yourself. Read about treatment and medicine options. If you have the energy, attend local conferences or support groups. Keep a list of useful websites and helpful books and use them as needed. This illness is not your fault. Dont blame yourself for your depression.  · Manage early symptoms. If you notice symptoms returning, experience triggers, or identify other factors that may lead to a depressive episode, get help as soon as possible. Ask trusted friends and family to monitor your behavior and let you know if they see anything of concern.  · Work with your provider. Find a provider you can trust. Communicate honestly with that person and share information on your treatment for depression and your reaction to medicines.  · Be prepared for a crisis. Know what to do if you experience a crisis. Keep the phone number of a crisis hotline and know the location of your community's urgent care centers and the closest emergency department.  · Hold off on big decisions. Depression can cloud your judgment. So wait until you feel better before making major life decisions, such as changing jobs, moving, or getting  or .  · Be patient. Recovering from depression is a process. Dont be discouraged if it takes some time to feel better.  · Keep it simple. Depression saps your energy and concentration. So you wont be able to do all the things you used to do. Set small goals and do what you can.  · Be with others. Dont isolate yourself--youll only feel worse. Try to be with other people. And take part in fun activities when you can. Go to a movie, ballgame, [employer] service, or social event. Talk openly with people you can trust. And accept help when its offered.  Take care of your body  People with depression often lose the desire to take care of themselves. That only makes their problems worse.  During treatment and afterward, make a point to:  · Exercise. Its a great way to take care of your body. And studies have shown that exercise helps fight depression.  · Avoid drugs and alcohol. These may ease the pain in the short term. But theyll only make your problems worse in the long run.  · Get relief from stress. Ask your healthcare provider for relaxation exercises and techniques to help relieve stress.  · Eat right. A balanced and healthy diet helps keep your body healthy.  Date Last Reviewed: 1/1/2017  © 2540-0745 [employer]. [address] [place] [address]. All rights reserved. This information is not intended as a substitute for professional medical care. Always follow your healthcare professional's instructions.

## 2018-06-14 NOTE — PROGRESS NOTES
Subjective:       Patient ID: Luciana Melo is a 48 y.o. female.    Chief Complaint: Anxiety    Pt is a 48 year old female to clinic today with complaints of HA, anxiety and depression that has been intermittent times 1 month. States related to work and illness (diabetes).       Anxiety   Presents for initial visit. Onset was 1 to 6 months ago. The problem has been waxing and waning. Symptoms include depressed mood and nervous/anxious behavior. Patient reports no chest pain, compulsions, confusion, decreased concentration, dizziness, dry mouth, excessive worry, feeling of choking, hyperventilation, insomnia, irritability, malaise, muscle tension, nausea, obsessions, palpitations, panic, restlessness, shortness of breath or suicidal ideas. Symptoms occur most days. The severity of symptoms is interfering with daily activities. The symptoms are aggravated by work stress. The quality of sleep is fair. Nighttime awakenings: occasional.     Past treatments include nothing.     Review of Systems   Constitutional: Negative for chills, diaphoresis, fatigue, fever and irritability.   HENT: Negative for congestion, ear pain, postnasal drip, rhinorrhea, sinus pressure, sneezing and sore throat.    Eyes: Negative for photophobia, pain and visual disturbance.   Respiratory: Negative for cough, chest tightness, shortness of breath and wheezing.    Cardiovascular: Negative for chest pain and palpitations.   Gastrointestinal: Negative for abdominal pain, diarrhea, nausea and vomiting.   Musculoskeletal: Negative for back pain, myalgias, neck pain and neck stiffness.   Skin: Negative for rash.   Neurological: Positive for headaches. Negative for dizziness, syncope, facial asymmetry, speech difficulty, weakness, light-headedness and numbness.   Psychiatric/Behavioral: Positive for dysphoric mood. Negative for confusion, decreased concentration, self-injury, sleep disturbance and suicidal ideas. The patient is nervous/anxious. The  patient does not have insomnia.        Objective:      Physical Exam   Constitutional: She is oriented to person, place, and time. She appears well-developed and well-nourished.  Non-toxic appearance. She does not have a sickly appearance. She does not appear ill. No distress.   HENT:   Head: Normocephalic.   Right Ear: Tympanic membrane, external ear and ear canal normal.   Left Ear: Tympanic membrane, external ear and ear canal normal.   Nose: Nose normal.   Mouth/Throat: Oropharynx is clear and moist.   Eyes: Conjunctivae and EOM are normal. Pupils are equal, round, and reactive to light.   Neck: Normal range of motion. Neck supple.   Neurological: She is alert and oriented to person, place, and time. She is not disoriented. No cranial nerve deficit or sensory deficit. Gait normal.   Skin: Skin is warm and dry. No rash noted. She is not diaphoretic.   Psychiatric: Her speech is normal and behavior is normal. Thought content normal. Her mood appears anxious. Cognition and memory are normal. She exhibits a depressed mood. She expresses no homicidal and no suicidal ideation. She expresses no suicidal plans and no homicidal plans.   Nursing note and vitals reviewed.      Assessment:       1. Anxiety with depression        Plan:   Anxiety with depression      Pt states she is unhappy with current PCP and would like to be evaluated by and establish care with another provider.   Recommend pt f/u with new PCP on appt made at todays visit.   Pt denies suicidal/homocidal ideations.     Follow prescribed treatment plan as directed.  Stay hydrated and rest.  Report to ER if symptoms worsen.  Follow up with PCP in 2-3 days or sooner if symptoms do not improve.

## 2018-06-14 NOTE — PATIENT INSTRUCTIONS
"  Headache, Unspecified    A number of things can cause headaches. The cause of your headache isnt clear. But it doesnt seem to be a sign of any serious illness.  You could have a tension headache or a migraine headache.  Stress can cause a tension headache. This can happen if you tense the muscles of your shoulders, neck, and scalp without knowing it. If this stress lasts long enough, you may develop a tension headache.  It is not clear why migraines occur, but certain things called" triggers" can raise the risk of having a migraine attack. Migraine triggers may include emotional stress or depression, or by hormone changes during the menstrual cycle. Other triggers include birth control pills and other medicines, alcohol or caffeine, foods with tyramine (such as aged cheese, wine), eyestrain, weather changes, missed meals, and lack of sleep or oversleeping.  Other causes of headache include:  · Viral illness with high fever  · Head injury with concussion  · Sinus, ear, or throat infection  · Dental pain and jaw joint (TMJ) pain  More serious but less common causes of headache include stroke, brain hemorrhage, brain tumor, meningitis, and encephalitis.  Home care  Follow these tips when taking care of yourself at home:  · Dont drive yourself home if you were given pain medicine for your headache. Instead, have someone else drive you home. Try to sleep when you get home. You should feel much better when you wake up.  · Apply heat to the back of your neck to ease a neck muscle spasm. Take care of a migraine headache by putting an ice pack on your forehead or at the base of your skull.  · If you have nausea or vomiting, eat a light diet until your headache eases.  · If you have a migraine headache, use sunglasses when in the daylight or around bright indoor lighting until your symptoms get better. Bright glaring light can make this type of headache worse.  Follow-up care  Follow up with your healthcare provider, or " as advised. Talk with your provider if you have frequent headaches. He or she can help figure out a treatment plan. By knowing the earliest signs of headache, and starting treatment right away, you may be able to stop the pain yourself.  When to seek medical advice  Call your healthcare provider right away if any of these occur:  · Your head pain suddenly gets worse after sexual intercourse or strenuous activity  · Your head pain doesnt get better within 24 hours  · You arent able to keep liquids down (repeated vomiting)  · Fever of 100.4ºF (38ºC) or higher, or as directed by your healthcare provider  · Stiff neck  · Extreme drowsiness, confusion, or fainting  · Dizziness or dizziness with spinning sensation (vertigo)  · Weakness in an arm or leg or one side of your face  · You have trouble talking or seeing  Date Last Reviewed: 8/1/2016  © 1664-5331 [employer]. [address] [place] [address], [place] [address]. All rights reserved. This information is not intended as a substitute for professional medical care. Always follow your healthcare professional's instructions.

## 2018-06-14 NOTE — PROGRESS NOTES
Subjective:       Patient ID: Lcuiana Melo is a 48 y.o. female.    Chief Complaint: Anxiety and Stress    49 yo female here requesting to establish care. She has had depression off and on for about 2 years; she was treated with sertraline for about 1 year by her former PCP Dr. Abrams. She had to switch providers due to insurance change and has not been on the medication sense. It was helpful. Has never seen a counselor. She complains of anxiety as well; describes her job as very stressful. She often misses work due to feeling like she can't face the day. She feels like her employer does not understand that she does not feel well some days.        does not have any pertinent problems on file.  Past Medical History:   Diagnosis Date    Diabetes     Hypertension      History reviewed. No pertinent surgical history.  Family History   Problem Relation Age of Onset    Diabetes Mother     Hypertension Mother     Diabetes Father     Hypertension Father     Diabetes Sister     Hypertension Sister     Hypertension Brother      Social History     Social History    Marital status: Single     Spouse name: N/A    Number of children: N/A    Years of education: N/A     Occupational History    Not on file.     Social History Main Topics    Smoking status: Current Every Day Smoker    Smokeless tobacco: Never Used    Alcohol use Yes      Comment: occasionally     Drug use: No    Sexual activity: Not on file     Other Topics Concern    Not on file     Social History Narrative    No narrative on file     Review of Systems   Constitutional: Positive for fatigue. Negative for activity change and appetite change.   Neurological: Positive for headaches. Negative for light-headedness and numbness.   Psychiatric/Behavioral: Positive for dysphoric mood and sleep disturbance. Negative for suicidal ideas. The patient is nervous/anxious.    All other systems reviewed and are negative.      Objective:     Vitals:     06/14/18 1636   BP: (!) 142/80   Pulse: (!) 130   Resp: 18   Temp: 98 °F (36.7 °C)        Physical Exam   Constitutional: She is oriented to person, place, and time. She appears well-developed and well-nourished.   HENT:   Head: Normocephalic and atraumatic.   Right Ear: External ear normal.   Left Ear: External ear normal.   Nose: Nose normal.   Eyes: Conjunctivae and EOM are normal. Pupils are equal, round, and reactive to light. No scleral icterus.   Cardiovascular: Normal rate, regular rhythm, normal heart sounds and intact distal pulses.    Pulmonary/Chest: Effort normal and breath sounds normal.   Abdominal: Soft. Bowel sounds are normal.   Neurological: She is alert and oriented to person, place, and time.   Normal gait. No speech abnormality   Psychiatric: Judgment normal. Her mood appears anxious. Her affect is labile. Her affect is not angry and not inappropriate. Her speech is not rapid and/or pressured, not delayed and not tangential. She is slowed and withdrawn. Cognition and memory are normal. She exhibits a depressed mood. She expresses no suicidal ideation. She expresses no suicidal plans.   Tearful throughout exam; feels hopeless and embarrassed by her depression. She is reluctant to reach out to her social support system; has become withdrawn instead. Missing work due to depression and has [employer] papers today that her employer has asked her to complete due to frequent work absences. She is attentive.   Nursing note and vitals reviewed.      Assessment:       1. Moderate episode of recurrent major depressive disorder        Plan:           Problem List Items Addressed This Visit     Moderate episode of recurrent major depressive disorder - Primary    Current Assessment & Plan     Start zoloft today with referral to counseling; she is concerned about the cost of seeing a therapist. Will complete [employer] paperwork for her. Info given on depression. She is not currently suicidal; advised to go to ER if  symptoms worsen or if she is considering suicide.          Relevant Medications    sertraline (ZOLOFT) 50 MG tablet    Other Relevant Orders    Ambulatory referral to Psychiatry      RTC 2 weeks for f/u depression and for diabetes/preventive health overdue maintenance

## 2018-06-14 NOTE — PROGRESS NOTES
Administered Toradol 30mg   IM to pt right ventrogluteal. Pt tolerated well. No distress noted. Advised pt to wait 20 minutes in lobby and to inform  if any adverse reaction occurs. Pt stated understanding.

## 2018-06-15 ENCOUNTER — TELEPHONE (OUTPATIENT)
Dept: INTERNAL MEDICINE | Facility: CLINIC | Age: 48
End: 2018-06-15

## 2018-06-15 NOTE — TELEPHONE ENCOUNTER
Spoke with patient and told her paper work is ready for . I will leave at the  for her. She verbalized understanding.

## 2018-07-27 ENCOUNTER — OFFICE VISIT (OUTPATIENT)
Dept: URGENT CARE | Facility: CLINIC | Age: 48
End: 2018-07-27
Payer: COMMERCIAL

## 2018-07-27 VITALS
OXYGEN SATURATION: 99 % | HEIGHT: 70 IN | TEMPERATURE: 99 F | RESPIRATION RATE: 18 BRPM | WEIGHT: 211 LBS | DIASTOLIC BLOOD PRESSURE: 86 MMHG | SYSTOLIC BLOOD PRESSURE: 150 MMHG | BODY MASS INDEX: 30.21 KG/M2 | HEART RATE: 85 BPM

## 2018-07-27 DIAGNOSIS — M77.11 LATERAL EPICONDYLITIS OF RIGHT ELBOW: Primary | ICD-10-CM

## 2018-07-27 PROCEDURE — 3079F DIAST BP 80-89 MM HG: CPT | Mod: CPTII,S$GLB,, | Performed by: FAMILY MEDICINE

## 2018-07-27 PROCEDURE — 99214 OFFICE O/P EST MOD 30 MIN: CPT | Mod: 25,S$GLB,, | Performed by: FAMILY MEDICINE

## 2018-07-27 PROCEDURE — 3077F SYST BP >= 140 MM HG: CPT | Mod: CPTII,S$GLB,, | Performed by: FAMILY MEDICINE

## 2018-07-27 PROCEDURE — 96372 THER/PROPH/DIAG INJ SC/IM: CPT | Mod: S$GLB,,, | Performed by: FAMILY MEDICINE

## 2018-07-27 PROCEDURE — 99999 PR PBB SHADOW E&M-EST. PATIENT-LVL IV: CPT | Mod: PBBFAC,,, | Performed by: FAMILY MEDICINE

## 2018-07-27 RX ORDER — METHYLPREDNISOLONE ACETATE 80 MG/ML
80 INJECTION, SUSPENSION INTRA-ARTICULAR; INTRALESIONAL; INTRAMUSCULAR; SOFT TISSUE ONCE
Status: COMPLETED | OUTPATIENT
Start: 2018-07-27 | End: 2018-07-27

## 2018-07-27 RX ORDER — KETOROLAC TROMETHAMINE 30 MG/ML
30 INJECTION, SOLUTION INTRAMUSCULAR; INTRAVENOUS
Status: COMPLETED | OUTPATIENT
Start: 2018-07-27 | End: 2018-07-27

## 2018-07-27 RX ADMIN — METHYLPREDNISOLONE ACETATE 80 MG: 80 INJECTION, SUSPENSION INTRA-ARTICULAR; INTRALESIONAL; INTRAMUSCULAR; SOFT TISSUE at 06:07

## 2018-07-27 RX ADMIN — KETOROLAC TROMETHAMINE 30 MG: 30 INJECTION, SOLUTION INTRAMUSCULAR; INTRAVENOUS at 06:07

## 2018-07-27 NOTE — PATIENT INSTRUCTIONS
Ice, rest, arm sling  OTC ibuprofen 200 mg, take 2-3 pills every 6-8 hours as needed for pain, starting tomorrow.   Follow up with PCP if not better in a few days, for further treatment      Understanding Lateral Epicondylitis    Tendons are strong bands of tissue that connect muscles to bones. Lateral epicondylitis affects the tendons that connect muscles in the forearm to the lateral epicondyle. This is the bony knob on the outer side of the elbow. The condition occurs if the extensor tendons of the wrist become red and swollen (irritated). This can cause pain in the elbow, forearm, and wrist. Because the condition is sometimes caused by playing tennis, it is also known as tennis elbow.  How to say it  [id number] [id number]   What causes lateral epicondylitis?  The condition most often occurs because of overuse. This can be from any activity that repeatedly puts stress on the forearm extensor muscles or tendons and wrist. For instance, playing tennis, lifting weights, cutting meat, painting, and typing can all cause the condition. Wear and tear of the tendons from aging or an injury to the tendons can also cause the condition.  Symptoms of lateral epicondylitis  The most common symptom is pain. You may feel it on the outer side of the elbow and down the back of the forearm. It may be worse when moving or using the elbow, forearm, or wrist. You may also feel pain when gripping or lifting things.  Treatment for lateral epicondylitis  Treatments may include:  · Resting the elbow, forearm, and wrist. Youll need to avoid movements that can make your symptoms worse. You also may need to avoid certain sports and types of work for a time. This helps relieve symptoms and prevent further damage to the tendons.  · Changing the action that caused the problem. For instance, if the tendons were damaged from playing tennis, it may help to change your playing technique or use different equipment. This helps  prevent further damage to the tendons.  · Using cold packs. Putting an ice pack on the injured area can help reduce pain and swelling.  · Taking pain medicines. Taking prescription or over-the-counter pain medicines may help reduce pain and swelling.    · Wearing a brace. This helps reduce strain on the muscles and tendons in the forearm, which may relieve symptoms. It is very important to wear the brace properly.  · Doing exercises and physical therapy. These help improve strength and range of motion in the elbow, forearm, and wrist.  · Getting shots of medicine into the injured area. These may help relieve symptoms for a time.  · Having surgery. This may be an option if other treatments fail to relieve symptoms. In many cases, the surgeon removes the damaged tissue.  Possible complications of lateral epicondylitis  If the tendons involved dont heal properly, symptoms may return or get worse. To help prevent this, follow your treatment plan as directed.  When to call your healthcare provider  Call your healthcare provider right away if you have any of these:  · Fever of 100.4°F (38°C) or higher, or as directed  · Redness, swelling, or warmth in the elbow or forearm that gets worse  · Symptoms that dont get better with treatment, or get worse  · New symptoms   Date Last Reviewed: 3/10/2016  © 1754-0372 [employer]. [address] [place] [address], [place] [address]. All rights reserved. This information is not intended as a substitute for professional medical care. Always follow your healthcare professional's instructions.

## 2018-07-27 NOTE — PROGRESS NOTES
"Subjective:       Patient ID: Luciana Melo is a 48 y.o. female.    Chief Complaint: Arm Pain (swelling x 3 days. extreme pain today )    BP (!) 150/86   Pulse 85   Temp 98.7 °F (37.1 °C) (Tympanic)   Resp 18   Ht 5' 9.5" (1.765 m)   Wt 95.7 kg (211 lb)   LMP 07/20/2018   SpO2 99%   BMI 30.71 kg/m²     HPI  Right arm pain for 3 days. No injury recalled. "Can't take the pain anymore". Pt is crying in the room. Right handed    Review of Systems   Constitutional: Positive for activity change.   Musculoskeletal: Positive for arthralgias, joint swelling and myalgias.       Objective:      Physical Exam   Constitutional: She is oriented to person, place, and time. She appears well-developed and well-nourished. No distress.   HENT:   Head: Normocephalic and atraumatic.   Eyes: EOM are normal. Pupils are equal, round, and reactive to light. Right eye exhibits no discharge. Left eye exhibits no discharge.   Neck: Normal range of motion. Neck supple.   Cardiovascular: Normal rate.    Pulmonary/Chest: Effort normal. No respiratory distress.   Musculoskeletal: Normal range of motion.   Right arm: warm and tender lateral humeral epicondyle. Patient is hold it immobile due to pain. Pain exacerbated by making a fist   Neurological: She is alert and oriented to person, place, and time.   Skin: Skin is warm and dry. No rash noted. She is not diaphoretic.   Psychiatric: She has a normal mood and affect.   Nursing note and vitals reviewed.      Assessment:       1. Lateral epicondylitis of right elbow        Plan:     Luciana was seen today for arm pain.    Diagnoses and all orders for this visit:    Lateral epicondylitis of right elbow  -     ketorolac injection 30 mg; Inject 1 mL (30 mg total) into the muscle one time.  -     methylPREDNISolone acetate injection 80 mg; Inject 1 mL (80 mg total) into the muscle once.      Instructions  Ice, rest, arm sling  OTC ibuprofen 200 mg, take 2-3 pills every 6-8 hours as needed for " pain, starting tomorrow.   Follow up with PCP if not better in a few days, for further treatment    Discussed with pt/family all information and results pertaining to this visit. Discussed diagnosis and plan of treatment.  All questions and concerns were addressed at this time. Pt/family expresses understanding of information and instructions.  Care and follow up instruction provided.

## 2018-07-30 NOTE — LETTER
June 14, 2018      Adrian Durham NP  [address] [place] [address]           [facility]  [address] [place] [address]  Phone: [phone]  Fax: [phone]          Patient: Luciana Melo   MR Number: [id number]   YOB: 1970   Date of Visit: 6/14/2018       Dear Adrian Durham:    Thank you for referring Luciana Melo to me for evaluation. Attached you will find relevant portions of my assessment and plan of care.    If you have questions, please do not hesitate to call me. I look forward to following Luciana Melo along with you.    Sincerely,    Taylor Malloy MD    Enclosure  CC:  No Recipients    If you would like to receive this communication electronically, please contact [email or web address] or [phone] to request more information on [employer] access.    For providers and/or their staff who would like to refer a patient to [facility], please contact us through our one-stop-shop provider referral line, [facility], at [phone].    If you feel you have received this communication in error or would no longer like to receive these types of communications, please e-mail [email or web address]          n/a

## 2018-10-17 ENCOUNTER — PATIENT OUTREACH (OUTPATIENT)
Dept: ADMINISTRATIVE | Facility: HOSPITAL | Age: 48
End: 2018-10-17

## 2018-10-18 ENCOUNTER — TELEPHONE (OUTPATIENT)
Dept: INTERNAL MEDICINE | Facility: CLINIC | Age: 48
End: 2018-10-18

## 2018-10-18 NOTE — TELEPHONE ENCOUNTER
Spoke with patient. She wanted to see Dr Malloy today. Told her that she only worked half a day today and she was gone for the day. Offered her other appointments available with other providers. She declined. She stated that she will call back if needed.

## 2018-10-18 NOTE — TELEPHONE ENCOUNTER
----- Message from Lawanda Myles sent at 10/18/2018 12:36 PM CDT -----  Contact: pt  The pt states she isn't feeling well and wants to be worked in today, the pt can be reached at [phone]///thxMW

## 2018-10-26 ENCOUNTER — OFFICE VISIT (OUTPATIENT)
Dept: INTERNAL MEDICINE | Facility: CLINIC | Age: 48
End: 2018-10-26
Payer: COMMERCIAL

## 2018-10-26 ENCOUNTER — LAB VISIT (OUTPATIENT)
Dept: LAB | Facility: HOSPITAL | Age: 48
End: 2018-10-26
Attending: FAMILY MEDICINE
Payer: COMMERCIAL

## 2018-10-26 VITALS
WEIGHT: 204.13 LBS | SYSTOLIC BLOOD PRESSURE: 110 MMHG | HEART RATE: 93 BPM | DIASTOLIC BLOOD PRESSURE: 72 MMHG | OXYGEN SATURATION: 98 % | TEMPERATURE: 98 F | BODY MASS INDEX: 30.23 KG/M2 | HEIGHT: 69 IN

## 2018-10-26 DIAGNOSIS — B37.9 YEAST INFECTION: ICD-10-CM

## 2018-10-26 DIAGNOSIS — Z59.9 ECONOMIC PROBLEM AFFECTING CARE: ICD-10-CM

## 2018-10-26 DIAGNOSIS — Z12.39 SCREENING FOR BREAST CANCER: ICD-10-CM

## 2018-10-26 DIAGNOSIS — Z91.199 NON-COMPLIANCE WITH TREATMENT: Primary | ICD-10-CM

## 2018-10-26 LAB
ANION GAP SERPL CALC-SCNC: 9 MMOL/L
BUN SERPL-MCNC: 10 MG/DL
CALCIUM SERPL-MCNC: 10 MG/DL
CHLORIDE SERPL-SCNC: 100 MMOL/L
CO2 SERPL-SCNC: 26 MMOL/L
CREAT SERPL-MCNC: 0.8 MG/DL
EST. GFR  (AFRICAN AMERICAN): >60 ML/MIN/1.73 M^2
EST. GFR  (NON AFRICAN AMERICAN): >60 ML/MIN/1.73 M^2
ESTIMATED AVG GLUCOSE: 301 MG/DL
GLUCOSE SERPL-MCNC: 448 MG/DL
HBA1C MFR BLD HPLC: 12.1 %
POTASSIUM SERPL-SCNC: 4.4 MMOL/L
SODIUM SERPL-SCNC: 135 MMOL/L

## 2018-10-26 PROCEDURE — 36415 COLL VENOUS BLD VENIPUNCTURE: CPT | Mod: PO

## 2018-10-26 PROCEDURE — 83036 HEMOGLOBIN GLYCOSYLATED A1C: CPT

## 2018-10-26 PROCEDURE — 3008F BODY MASS INDEX DOCD: CPT | Mod: CPTII,S$GLB,, | Performed by: FAMILY MEDICINE

## 2018-10-26 PROCEDURE — 99999 PR PBB SHADOW E&M-EST. PATIENT-LVL V: CPT | Mod: PBBFAC,,, | Performed by: FAMILY MEDICINE

## 2018-10-26 PROCEDURE — 90471 IMMUNIZATION ADMIN: CPT | Mod: 59,S$GLB,, | Performed by: FAMILY MEDICINE

## 2018-10-26 PROCEDURE — 90472 IMMUNIZATION ADMIN EACH ADD: CPT | Mod: S$GLB,,, | Performed by: FAMILY MEDICINE

## 2018-10-26 PROCEDURE — 3074F SYST BP LT 130 MM HG: CPT | Mod: CPTII,S$GLB,, | Performed by: FAMILY MEDICINE

## 2018-10-26 PROCEDURE — 99214 OFFICE O/P EST MOD 30 MIN: CPT | Mod: 25,S$GLB,, | Performed by: FAMILY MEDICINE

## 2018-10-26 PROCEDURE — 80048 BASIC METABOLIC PNL TOTAL CA: CPT

## 2018-10-26 PROCEDURE — 90732 PPSV23 VACC 2 YRS+ SUBQ/IM: CPT | Mod: S$GLB,,, | Performed by: FAMILY MEDICINE

## 2018-10-26 PROCEDURE — 90686 IIV4 VACC NO PRSV 0.5 ML IM: CPT | Mod: S$GLB,,, | Performed by: FAMILY MEDICINE

## 2018-10-26 PROCEDURE — 3046F HEMOGLOBIN A1C LEVEL >9.0%: CPT | Mod: CPTII,S$GLB,, | Performed by: FAMILY MEDICINE

## 2018-10-26 PROCEDURE — 3078F DIAST BP <80 MM HG: CPT | Mod: CPTII,S$GLB,, | Performed by: FAMILY MEDICINE

## 2018-10-26 RX ORDER — FLUCONAZOLE 150 MG/1
TABLET ORAL
Qty: 2 TABLET | Refills: 0 | Status: SHIPPED | OUTPATIENT
Start: 2018-10-26 | End: 2018-11-28 | Stop reason: SDUPTHER

## 2018-10-26 RX ORDER — PEN NEEDLE, DIABETIC 30 GX3/16"
NEEDLE, DISPOSABLE MISCELLANEOUS
Qty: 90 EACH | Refills: 3 | Status: SHIPPED | OUTPATIENT
Start: 2018-10-26 | End: 2018-11-06 | Stop reason: SDUPTHER

## 2018-10-26 SDOH — SOCIAL DETERMINANTS OF HEALTH (SDOH): PROBLEM RELATED TO HOUSING AND ECONOMIC CIRCUMSTANCES, UNSPECIFIED: Z59.9

## 2018-10-26 NOTE — PATIENT INSTRUCTIONS
Start vitamin D 1,000 IU daily (over the counter)  Goal fasting blood sugar between 80 and 130 mg/dL  2 hours after eating blood sugar below 180 mg/dl         Exenatide injection solution  What is this medicine?  EXENATIDE (ex EN a tide) is used to improve blood sugar control in adults with type 2 diabetes. This medicine may be used with other oral diabetes medicines.  How should I use this medicine?  This medicine is for injection under the skin of your upper leg, stomach area, or upper arm. You will be taught how to prepare and give this medicine. Use exactly as directed. Take your medicine at regular intervals. Do not take it more often than directed.  It is important that you put your used needles and syringes in a special sharps container. Do not put them in a trash can. If you do not have a sharps container, call your pharmacist or healthcare provider to get one.  A special MedGuide will be given to you by the pharmacist with each prescription and refill. Be sure to read this information carefully each time.Talk to your pediatrician regarding the use of this medicine in children. Special care may be needed.  What side effects may I notice from receiving this medicine?  Side effects that you should report to your doctor or health care professional as soon as possible:  · allergic reactions like skin rash, itching or hives, swelling of the face, lips, or tongue  · breathing problems  · signs and symptoms of low blood sugar such as feeling anxious, confusion, dizziness, increased hunger, unusually weak or tired, sweating, shakiness, cold, irritable, headache, blurred vision, fast heartbeat, loss of consciousness  · swelling of the ankles, feet, hands  · trouble passing urine or change in the amount of urine  · unusual stomach pain or upset  · unusually weak or tired  · vomiting  Side effects that usually do not require medical attention (report to your doctor or health care professional if they continue or are  bothersome):  · constipation  · diarrhea  · dizziness  · headache  · heartburn  · nausea  What may interact with this medicine?  Do not take this medicine with any of the following medications:  · gatifloxacin  This medicine may also interact with the following medications:  · acetaminophen  · birth control pills  · digoxin  · lisinopril  · lovastatin  · sulfonylureas  · warfarin  Many medications may cause changes in blood sugar, these include:  · alcohol containing beverages  · aspirin and aspirin-like drugs  · chloramphenicol  · chromium  · diuretics  · female hormones, such as estrogens or progestins, birth control pills  · heart medicines  · isoniazid  · male hormones or anabolic steroids  · medications for weight loss  · medicines for allergies, asthma, cold, or cough  · medicines for mental problems  · medicines called MAO inhibitors - Nardil, Parnate, Marplan, Eldepryl  · niacin  · NSAIDS, such as ibuprofen  · pentamidine  · phenytoin  · probenecid  · quinolone antibiotics such as ciprofloxacin, levofloxacin, ofloxacin  · some herbal dietary supplements  · steroid medicines such as prednisone or cortisone  · thyroid hormones  Some medications can hide the warning symptoms of low blood sugar (hypoglycemia). You may need to monitor your blood sugar more closely if you are taking one of these medications. These include:  · beta-blockers, often used for high blood pressure or heart problems (examples include atenolol, metoprolol, propranolol)  · clonidine  · guanethidine  · reserpine  What if I miss a dose?  If you miss a dose, take it as soon as you can. If it is almost time for your next dose, take only that dose. Do not take double or extra doses.  Where should I keep my medicine?  Keep out of the reach of children.  Store unopened pen in a refrigerator between 2 and 8 degrees C (36 and 46 degrees F). Do not freeze or use if the medicine has been frozen. Protect from light and excessive heat. After you first  use the pen, it should be kept at a temperature not to exceed 25 degrees C (77 degrees F). Throw away your used pen after 30 days or after the expiration date, whichever comes first.  Do not store your pen with the needle attached. If the needle is left on, medicine may leak from the pen or air bubbles may form in the cartridge.  What should I tell my health care provider before I take this medicine?  They need to know if you have any of these conditions:  · history of pancreatitis  · kidney disease or if you are on dialysis  · stomach problems  · an unusual or allergic reaction to exenatide, medicines, foods, dyes, or preservatives  · pregnant or trying to get pregnant  · breast-feeding  What should I watch for while using this medicine?  Visit your doctor or health care professional for regular checks on your progress.  A test called the HbA1C (A1C) will be monitored. This is a simple blood test. It measures your blood sugar control over the last 2 to 3 months. You will receive this test every 3 to 6 months.  Learn how to check your blood sugar. Learn the symptoms of low and high blood sugar and how to manage them.  Always carry a quick-source of sugar with you in case you have symptoms of low blood sugar. Examples include hard sugar candy or glucose tablets. Make sure others know that you can choke if you eat or drink when you develop serious symptoms of low blood sugar, such as seizures or unconsciousness. They must get medical help at once.  Tell your doctor or health care professional if you have high blood sugar. You might need to change the dose of your medicine. If you are sick or exercising more than usual, you might need to change the dose of your medicine.  Do not skip meals. Ask your doctor or health care professional if you should avoid alcohol. Many nonprescription cough and cold products contain sugar or alcohol. These can affect blood sugar.  Exenatide pens and cartridges should never be shared.  Even if the needle is changed, sharing may result in passing of viruses like hepatitis or HIV.  Wear a medical ID bracelet or chain, and carry a card that describes your disease and details of your medicine and dosage times.  NOTE:This sheet is a summary. It may not cover all possible information. If you have questions about this medicine, talk to your doctor, pharmacist, or health care provider. Copyright© 2017 [employer]

## 2018-10-26 NOTE — PROGRESS NOTES
Subjective:       Patient ID: Luciana Melo is a 48 y.o. female.    Chief Complaint: Emesis; Diabetes; and Vaginitis    48-year-old female here due to uncontrolled diabetes, vaginal yeast infection, and an episode of vomiting which resolved.  She states that she was seen at an urgent care in May due to inability to afford her insulin.  She was assisted with 3 months supply of Lantus at that time.  She states she has been off insulin for about 6 weeks.  She reports decreased appetite, weight loss.  She also reports vaginal itching and thick white discharge. She does not understand why she is getting a yeast infection.  She states that over the counter medication does not seem to work well for her.  She has had good results with fluticasone in the past.  She checks her blood glucose on occasion.  Reports readings are typically near 200.  She was last seen by me for complaint of anxiety and depression and was started on sertraline.  She reports that she is doing well on this medication, however she reported to the nurse that she is no longer taking it.    She has recently switched from what I suspect was a Medicaid Advantage plan with full coverage of her medications and no co-pay for office visits.  She now has employer provided Blue Cross Blue Shield health insurance.  Much of our discussion today focuses on her difficulty with cost.  She has poor compliance with screening test, medication, follow-up physician visits.  I believe this is almost entirely due to fear of cost.  She has many questions about how much her medication will be, how much it will cost to get a mammogram, when she will have to pay a co-pay.  She has been referred to diabetes management but did not get it filled.  At my last visit, I informed her that she was overdue for health maintenance visit however she has been late to return for health maintenance visit.  We had a discussion about improving communication with her physician via the phone or  the my [facility].       does not have any pertinent problems on file.  Past Medical History:   Diagnosis Date    Anxiety     Depression     Diabetes     Diabetes mellitus, type 2     Hyperlipidemia     Hypertension      Past Surgical History:   Procedure Laterality Date     SECTION       Family History   Problem Relation Age of Onset    Diabetes Mother     Hypertension Mother     Diabetes Father     Hypertension Father     Diabetes Sister     Hypertension Sister     Hypertension Brother      Social History     Socioeconomic History    Marital status: Single     Spouse name: Not on file    Number of children: 5    Years of education: Not on file    Highest education level: Not on file   Social Needs    Financial resource strain: Not on file    Food insecurity - worry: Not on file    Food insecurity - inability: Not on file    Transportation needs - medical: Not on file    Transportation needs - non-medical: Not on file   Occupational History    Not on file   Tobacco Use    Smoking status: Current Every Day Smoker     Packs/day: 0.25    Smokeless tobacco: Never Used   Substance and Sexual Activity    Alcohol use: Yes     Comment: occasionally     Drug use: No    Sexual activity: Not on file   Other Topics Concern    Not on file   Social History Narrative    Not on file     Review of Systems   Constitutional: Positive for fatigue and unexpected weight change.   HENT: Negative for hearing loss and sore throat.    Eyes: Negative for pain and visual disturbance.   Respiratory: Negative for cough and shortness of breath.    Cardiovascular: Negative for chest pain and palpitations.   Gastrointestinal: Positive for vomiting. Negative for abdominal pain, constipation and diarrhea.   Genitourinary: Positive for vaginal discharge. Negative for difficulty urinating and dysuria.        Two week history of off and on vaginal itching, burning, and thick white discharge. She states that it  has recently worsened.  No home treatments tried.  She is not sexually active.   Musculoskeletal: Negative for arthralgias and myalgias.   Skin: Negative for rash and wound.   Neurological: Negative for light-headedness and headaches.   Hematological: Negative.    Psychiatric/Behavioral: Negative for dysphoric mood. The patient is nervous/anxious.        Objective:     Vitals:    10/26/18 0911   BP: 110/72   Pulse: 93   Temp: 97.5 °F (36.4 °C)        Physical Exam   Constitutional: She is oriented to person, place, and time. She appears well-developed and well-nourished.   HENT:   Head: Normocephalic and atraumatic.   Right Ear: External ear normal.   Left Ear: External ear normal.   Nose: Nose normal.   Eyes: Conjunctivae and EOM are normal. Pupils are equal, round, and reactive to light. No scleral icterus.   Abdominal: Soft. There is no tenderness.   Musculoskeletal: Normal range of motion. She exhibits no deformity.   Neurological: She is alert and oriented to person, place, and time.   Normal gait. No speech abnormality   Skin: Skin is warm and dry.   Psychiatric: She has a normal mood and affect. Her behavior is normal. Judgment and thought content normal.   Nursing note and vitals reviewed.      Assessment:       1. Non-compliance with treatment    2. Uncontrolled type 2 diabetes mellitus with microalbuminuria, with long-term current use of insulin    3. Yeast infection    4. Screening for breast cancer    5. Economic problem affecting care        Plan:           Problem List Items Addressed This Visit        Psychiatric    Non-compliance with treatment - Primary    Relevant Orders    Ambulatory referral to Outpatient Case Management       Endocrine    Uncontrolled type 2 diabetes mellitus with microalbuminuria, with long-term current use of insulin    Overview     Overview:   Newly dx with many questions         Relevant Medications    exenatide (BYETTA) 5 mcg/dose (250 mcg/mL) 1.2 mL injection    pen  "needle, diabetic 31 gauge x 5/16" Ndle    Other Relevant Orders    Hemoglobin A1c    Basic metabolic panel    Pneumococcal Polysaccharide Vaccine (23 Valent) (SQ/IM) (Completed)    Ambulatory referral to Ophthalmology    Ambulatory referral to Outpatient Case Management      Other Visit Diagnoses     Yeast infection        Relevant Medications    fluconazole (DIFLUCAN) 150 MG Tab    Screening for breast cancer        Relevant Orders    Mammo Digital Screening Bilat    Economic problem affecting care        Relevant Orders    Ambulatory referral to Outpatient Case Management       Long discussion today regarding diabetes management as well as the importance of communication with her providers.  We discussed navigation of the healthcare system.  Advised her that heat insurance plan has different coverage for various services.  She should be able to find this information through her insurance company.  I encouraged her to take advantage of all covered preventive health.  Most plans do provide coverage for preventive health maintenance.  I also her encouraged her to use other forms of communication that did not require a co-pay.  Advised that it is hard for us to help her if we do not know when she is or is not taking her medication.  We discussed the dangers of uncontrolled diabetes.  I have referred her to outpatient case management.  We discussed diabetic eye exam and screening mammogram.  Unsure whether she will follow through with but I let her know that the referral orders were entered.  Discussed recurrent Candida vaginitis has a known complication of uncontrolled diabetes.  Explained to patient the reason for this.  Will see back in clinic in 3 months and will communicate with patient via phone when possible until then.  "

## 2018-10-29 ENCOUNTER — TELEPHONE (OUTPATIENT)
Dept: INTERNAL MEDICINE | Facility: CLINIC | Age: 48
End: 2018-10-29

## 2018-10-29 NOTE — TELEPHONE ENCOUNTER
----- Message from Trey Latham sent at 10/29/2018 10:33 AM CDT -----  Contact: pt   Pt went to pharmacy to  script and insurance will not cover insulin , need prior authorization sent , pls return call.         ..[phone] (home)          ..  [facility] [address] [place] [id number] [address] [address]  Phone: [phone] Fax: [phone]

## 2018-11-01 ENCOUNTER — TELEPHONE (OUTPATIENT)
Dept: INTERNAL MEDICINE | Facility: CLINIC | Age: 48
End: 2018-11-01

## 2018-11-01 NOTE — TELEPHONE ENCOUNTER
----- Message from Maria Del Carmen Kelley sent at 11/1/2018 11:15 AM CDT -----  Contact: pt  She is calling in regards to getting a prior authorization for her insurance pertaining to the pt's insulin medication, please advise [phone] (home)

## 2018-11-01 NOTE — TELEPHONE ENCOUNTER
Called and spoke with insurance company who had questions about if patient has tried metformin, sulfonylurea, and pioglitazone. I saw in patients records she has tried Metformin. I advised them of that, lab results, and diagnosis. Insurance rep advised she marked it as urgent. I called and spoke with the patient to advise her of the update on PA. Patient verbalized understanding.

## 2018-11-02 ENCOUNTER — TELEPHONE (OUTPATIENT)
Dept: INTERNAL MEDICINE | Facility: CLINIC | Age: 48
End: 2018-11-02

## 2018-11-02 NOTE — TELEPHONE ENCOUNTER
Spoke with patients insurance company and they advised the medication BYETTA is approved confirmation #[id number].     Spoke with patient and advised her of medication approval she verbalized understanding.

## 2018-11-06 ENCOUNTER — TELEPHONE (OUTPATIENT)
Dept: INTERNAL MEDICINE | Facility: CLINIC | Age: 48
End: 2018-11-06

## 2018-11-06 RX ORDER — INSULIN GLARGINE 100 [IU]/ML
20 INJECTION, SOLUTION SUBCUTANEOUS NIGHTLY
Qty: 15 ML | Refills: 3 | Status: SHIPPED | OUTPATIENT
Start: 2018-11-06 | End: 2019-04-18 | Stop reason: SDUPTHER

## 2018-11-06 RX ORDER — PEN NEEDLE, DIABETIC 30 GX3/16"
NEEDLE, DISPOSABLE MISCELLANEOUS
Qty: 90 EACH | Refills: 3 | Status: SHIPPED | OUTPATIENT
Start: 2018-11-06

## 2018-11-06 NOTE — TELEPHONE ENCOUNTER
Spoke with patient who advised her Byetta is to expensive even with insurance, she has not gotten. She advised she would like some cheaper prescribed and something her insurance would cover. She advised she used to be on insurance and not sure why  dosen't want her on it anymore. Please advise, pharmacy set up.

## 2018-11-06 NOTE — TELEPHONE ENCOUNTER
----- Message from Tosin Nunn sent at 11/6/2018 10:04 AM CST -----  Contact: Patient  Patient needs to talk to nurse about her insulin, stating its to expensive and needs something else called in, please call her back at [phone]. Thank you

## 2018-11-07 ENCOUNTER — TELEPHONE (OUTPATIENT)
Dept: INTERNAL MEDICINE | Facility: CLINIC | Age: 48
End: 2018-11-07

## 2018-11-07 ENCOUNTER — OUTPATIENT CASE MANAGEMENT (OUTPATIENT)
Dept: ADMINISTRATIVE | Facility: OTHER | Age: 48
End: 2018-11-07

## 2018-11-07 NOTE — TELEPHONE ENCOUNTER
Signed patient up for Lantus Savings Card with a $0 copay. Patient going to  savings card at the ,

## 2018-11-07 NOTE — TELEPHONE ENCOUNTER
Spoke with patient who advised her copay for Lantus was over $100 dollars and she cannot afford that. She advised this year she was on a program with [facility] where she didn't have to pay anything. I advised patient that I would look into it and see.

## 2018-11-07 NOTE — PROGRESS NOTES
Please note the following patients information has been forwarded to [facility] for Case Management and/or .    Please see the media section in patient's chart for additional details.    Please contact Outpatient Complex care Management at ext [phone] with any questions.    Thank you,    Harper Quijano, [facility]  Outpatient Care Mgmt.  [phone]

## 2018-11-08 ENCOUNTER — PATIENT OUTREACH (OUTPATIENT)
Dept: ADMINISTRATIVE | Facility: HOSPITAL | Age: 48
End: 2018-11-08

## 2018-11-08 NOTE — PROGRESS NOTES
I have contacted patient to schedule an appt for [facility]. Patient stated that she will call back to schedule appointment.

## 2018-11-20 ENCOUNTER — NURSE TRIAGE (OUTPATIENT)
Dept: ADMINISTRATIVE | Facility: CLINIC | Age: 48
End: 2018-11-20

## 2018-11-20 NOTE — TELEPHONE ENCOUNTER
"    Reason for Disposition   Patient sounds very sick or weak to the triager     Luciana called to say she is feeling weak, her CBGs have been "over 400 every time I check".  The last time she checked (yesterday morning) it was 452, she did not recheck it yesterday or today, and she said she has not eaten since then.  She states she is taking the Lantus "the way I am supposed to, but I am not eating because I am afraid my sugars will keep going up." Luciana states Dr Taylor Malloy, her PCP, is out of clinic for the holiday week.  I note that she is distracted during this call, and she says she is getting ready to go to work.  She is encouraged to go to the ED now for evaluation and treatment, hold off going to work until this problem is evaluated and she is feeling better, with sugars under control.  She is also encouraged to call clinic after she has been seen in ED, to schedule appt for a follow up with Dr Malloy or another provider in Dr Malloy's absence. Recommend diabetes education.  Message to pcp, Taylor Malloy.  Please contact caller directly with any additional care advice.    Protocols used: ST DIABETES - HIGH BLOOD SUGAR-A-OH      "

## 2018-11-28 ENCOUNTER — TELEPHONE (OUTPATIENT)
Dept: INTERNAL MEDICINE | Facility: CLINIC | Age: 48
End: 2018-11-28

## 2018-11-28 DIAGNOSIS — Z91.199 NON-COMPLIANCE WITH TREATMENT: Primary | ICD-10-CM

## 2018-11-28 DIAGNOSIS — B37.9 YEAST INFECTION: ICD-10-CM

## 2018-11-28 RX ORDER — FLUCONAZOLE 150 MG/1
TABLET ORAL
Qty: 2 TABLET | Refills: 3 | Status: SHIPPED | OUTPATIENT
Start: 2018-11-28

## 2018-11-28 NOTE — TELEPHONE ENCOUNTER
Notified patient that Rx was sent into her pharmacy. Patient verbalized understanding.     Patient advised she increased the dosage of Lantus to 50 units daily from 20 units d/t her BG readings being 400-500. She advised since her increase her BG in AM upon awakening is 150-220, her BG at night varies but usually over 300. I advised patient with her self dosage increase and its not controlled still. Patient advised she does not have the money to come in this week. I offered her to speak with Esther when she checks in to establish a payment plan. Patient refused and said she will come in next week. I advised patient that it is best that she be seen this week. Patient still advised she wants to be scheduled next week. Patient scheduled to see Dr. Malloy on Monday and requested time.

## 2018-11-28 NOTE — TELEPHONE ENCOUNTER
Patient scheduled for next available with endocrinology. Patient wants to know what to do in the mean time for her diabetes.

## 2018-11-28 NOTE — TELEPHONE ENCOUNTER
----- Message from Gus Noriega sent at 11/28/2018 10:29 AM CST -----  Contact: [phone]  Pt would like a Diflucan called in for a yeast infection.  Please call back at [phone].Thx-             Pt Uses:  .  [facility] [address] [place] [address]  Phone: [phone] Fax: [phone]

## 2018-11-28 NOTE — TELEPHONE ENCOUNTER
Spoke with patient who advised shes having symptoms of a yeast infection, couldn't go into much detail d/t being at work. She advised she would like Diflucan sent to her pharmacy [facility] on [address]. Please advise.

## 2018-11-28 NOTE — TELEPHONE ENCOUNTER
I have entered a referral for her to see the endocrine diabetes clinic. Since she has to save for visit co-pays, she may do best to wait for this appointment. Please coordinate with patient.

## 2018-11-28 NOTE — PROGRESS NOTES
Patient having elevated blood glucose readings. Poor compliance and difficulty with medications due to finances. New rx for Jardiance sent in. Continue lantus. Goal fasting blood sugar is between 80 and 130 mg/dL; postprandial blood sugar below 180 mg/dl with no levels under 70.

## 2018-11-29 NOTE — TELEPHONE ENCOUNTER
Spoke with patient and advised of new medication sent in and to go back to prescribed original dose of insulin. Patient verbalized understanding.

## 2018-12-06 ENCOUNTER — TELEPHONE (OUTPATIENT)
Dept: INTERNAL MEDICINE | Facility: CLINIC | Age: 48
End: 2018-12-06

## 2018-12-06 NOTE — TELEPHONE ENCOUNTER
Received fax that patients Jardiance 10 mg PA is approved from 11/28/2018-11/27/2019. PA REF # [id number].

## 2019-01-15 NOTE — TELEPHONE ENCOUNTER
----- Message from Saida Kauffman sent at 1/15/2019  4:25 PM CST -----  Contact: self   Patient only has two days left of test strips and lancets. She is requesting enough sent to pharmacy until she can see someone to provide her with this. Patient would like to know why she referred out to endocrinology.if this is something that a pcp can handle she would like to know the name.Please call back at [phone].    Pt uses:  [facility]      Thanks,  Saida Kauffman

## 2019-01-18 RX ORDER — BLOOD-GLUCOSE CONTROL, NORMAL
1 EACH MISCELLANEOUS 2 TIMES DAILY
Qty: 100 EACH | Refills: 2 | Status: SHIPPED | OUTPATIENT
Start: 2019-01-18

## 2019-03-01 ENCOUNTER — PATIENT OUTREACH (OUTPATIENT)
Dept: ADMINISTRATIVE | Facility: HOSPITAL | Age: 49
End: 2019-03-01

## 2019-04-03 PROBLEM — M79.604 PAIN IN BOTH LOWER EXTREMITIES: Status: ACTIVE | Noted: 2019-04-03

## 2019-04-03 PROBLEM — R94.31 ABNORMAL EKG: Status: ACTIVE | Noted: 2019-04-03

## 2019-04-03 PROBLEM — Z00.00 ANNUAL PHYSICAL EXAM: Status: ACTIVE | Noted: 2019-04-03

## 2019-04-03 PROBLEM — Z78.0 MENOPAUSE: Status: ACTIVE | Noted: 2019-04-03

## 2019-04-03 PROBLEM — M79.605 PAIN IN BOTH LOWER EXTREMITIES: Status: ACTIVE | Noted: 2019-04-03

## 2019-04-03 PROBLEM — I11.9 LVH (LEFT VENTRICULAR HYPERTROPHY) DUE TO HYPERTENSIVE DISEASE, WITHOUT HEART FAILURE: Status: ACTIVE | Noted: 2019-04-03

## 2019-04-12 DIAGNOSIS — Z12.11 COLON CANCER SCREENING: ICD-10-CM

## 2019-04-24 ENCOUNTER — PATIENT OUTREACH (OUTPATIENT)
Dept: ADMINISTRATIVE | Facility: HOSPITAL | Age: 49
End: 2019-04-24

## 2019-04-24 NOTE — PROGRESS NOTES
Attempting to contact pt to schedule NV for F/U BP. Unable to reach patient at this time. Unable to leave voicemail.

## 2019-05-23 ENCOUNTER — TELEPHONE (OUTPATIENT)
Dept: INTERNAL MEDICINE | Facility: CLINIC | Age: 49
End: 2019-05-23

## 2019-05-23 NOTE — TELEPHONE ENCOUNTER
----- Message from Margret Garcia sent at 5/23/2019  3:35 PM CDT -----  Contact: patient  Type:  Needs Medical Advice    Who Called: patient  Symptoms (please be specific): -   How long has patient had these symptoms:  -  Pharmacy name and phone #:  -  Would the patient rather a call back or a response via [facility]? call  Best Call Back Number: [phone]  Additional Information: calling concerning FMLA. PLEASE CALL PATIENT ASAP TODAY.

## 2019-05-23 NOTE — TELEPHONE ENCOUNTER
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 She quit the job because she could not handle it anymore. She asking for something stating the reason she quit which she states that its because of her health condition. She left the job in April because of her health reasons.

## 2019-06-04 ENCOUNTER — TELEPHONE (OUTPATIENT)
Dept: INTERNAL MEDICINE | Facility: CLINIC | Age: 49
End: 2019-06-04

## 2019-06-04 NOTE — TELEPHONE ENCOUNTER
----- Message from Yana Ramon sent at 6/4/2019  1:40 PM CDT -----  Contact: Pt  Please give pt a call at .[phone] (fjli) regarding a letter she has been requesting

## 2019-06-04 NOTE — LETTER
June 4, 2019                 [facility] Internal Medicine  Internal Medicine  [address]  [place] [address]  Phone: [phone]  Fax: [phone]   June 4, 2019     Patient: Luciana Melo   YOB: 1970   Date of Visit: 6/4/2019       To Whom it May Concern:    Luciana Melo resigned from the [employer] on April 12,2019 . Luciana Melo resigned due to serious medical conditions that would not allow her to complete her daily job task .       If you have any questions or concerns, please don't hesitate to call.    Sincerely,         Dr. Taylor Malloy

## 2019-06-05 ENCOUNTER — TELEPHONE (OUTPATIENT)
Dept: INTERNAL MEDICINE | Facility: CLINIC | Age: 49
End: 2019-06-05

## 2019-06-05 NOTE — TELEPHONE ENCOUNTER
I spoke with pt I let pt know that letter has been typed up for her pt states that when she called the letter was not at the  and she was told to call at 12 to check on letter

## 2019-06-05 NOTE — TELEPHONE ENCOUNTER
----- Message from Carmina Borrego sent at 6/5/2019  2:12 PM CDT -----  Contact: self  needs call back regarding status of letter..[phone] (home)

## 2019-07-08 ENCOUNTER — TELEPHONE (OUTPATIENT)
Dept: INTERNAL MEDICINE | Facility: CLINIC | Age: 49
End: 2019-07-08

## 2019-07-08 PROBLEM — Z00.00 ANNUAL PHYSICAL EXAM: Status: RESOLVED | Noted: 2019-04-03 | Resolved: 2019-07-08

## 2019-07-08 NOTE — TELEPHONE ENCOUNTER
----- Message from Flor Sheikh sent at 7/8/2019 12:05 PM CDT -----  Contact: Self  Type:  Sooner Apoointment Request    Caller is requesting a sooner appointment.  Caller declined first available appointment listed below.  Caller will not accept being placed on the waitlist and is requesting a message be sent to doctor.  Name of Caller:Luciana  When is the first available appointment? None available  Symptoms:diabetic check up  Would the patient rather a call back or a response via [facility]? call  Best Call Back Number:[phone]  Additional Information: patient is not a new patient her insurance changed.

## 2019-08-14 ENCOUNTER — TELEPHONE (OUTPATIENT)
Dept: INTERNAL MEDICINE | Facility: CLINIC | Age: 49
End: 2019-08-14

## 2019-08-14 NOTE — TELEPHONE ENCOUNTER
----- Message from Carmina Borrego sent at 8/14/2019 10:19 AM CDT -----  Contact: self  needs call back to discuss letter written last month, will elaborate....[phone] (home)

## 2019-08-14 NOTE — TELEPHONE ENCOUNTER
Spoke with the patient states that her letter did not state the specific reasons that was related to her job . It needs to state that the illness she had was resulted from this job basicially the job caused these medical issues

## 2019-08-14 NOTE — TELEPHONE ENCOUNTER
Unfortunately I can not elaborate any further in the letter; I recommend she consult with an occupational medicine physician who specializes in workplace injuries and employee health, such as [facility].

## 2019-08-15 ENCOUNTER — TELEPHONE (OUTPATIENT)
Dept: INTERNAL MEDICINE | Facility: CLINIC | Age: 49
End: 2019-08-15

## 2019-08-15 NOTE — TELEPHONE ENCOUNTER
Patient stated that she did not get injured on the job . The problem occurred for the stress etc from the job. She want you to call her [phone]

## 2019-08-15 NOTE — TELEPHONE ENCOUNTER
----- Message from Carmina Borrego sent at 8/15/2019 11:05 AM CDT -----  ..Type:  Patient Returning Call    Who Called:self  Who Left Message for Patient:vijayabobkumar  Does the patient know what this is regarding?:letter  Would the patient rather a call back or a response via [facility]? call  Best Call Back Number:.[phone] (home)   Additional Information:

## 2019-08-15 NOTE — TELEPHONE ENCOUNTER
Called to inform patient about the letter request that she asked for . Per Dr. Malloy she can not elaborate any further in the letter:she recommend she consult with the occupational medicine physician who specializes in workplace injuries and employee health , such as [facility].       No answer left voicemail to return the call   Maryam Galeano

## 2025-03-23 NOTE — TELEPHONE ENCOUNTER
----- Message from Kary Morrison sent at 11/1/2018  3:20 PM CDT -----  Contact: self [phone]  Pt with like to consult nurse, about RX Medication. Please call back at [phone]... Thanks sj   stretcher